# Patient Record
Sex: MALE | Race: WHITE | NOT HISPANIC OR LATINO | Employment: FULL TIME | URBAN - METROPOLITAN AREA
[De-identification: names, ages, dates, MRNs, and addresses within clinical notes are randomized per-mention and may not be internally consistent; named-entity substitution may affect disease eponyms.]

---

## 2019-02-20 ENCOUNTER — HOSPITAL ENCOUNTER (EMERGENCY)
Facility: CLINIC | Age: 54
Discharge: HOME | End: 2019-02-20
Attending: EMERGENCY MEDICINE | Admitting: EMERGENCY MEDICINE
Payer: COMMERCIAL

## 2019-02-20 VITALS
TEMPERATURE: 98.8 F | RESPIRATION RATE: 18 BRPM | SYSTOLIC BLOOD PRESSURE: 155 MMHG | DIASTOLIC BLOOD PRESSURE: 103 MMHG | HEART RATE: 96 BPM | OXYGEN SATURATION: 94 %

## 2019-02-20 DIAGNOSIS — T78.40XA ALLERGIC REACTION, INITIAL ENCOUNTER: ICD-10-CM

## 2019-02-20 PROCEDURE — 96374 THER/PROPH/DIAG INJ IV PUSH: CPT

## 2019-02-20 PROCEDURE — 96361 HYDRATE IV INFUSION ADD-ON: CPT

## 2019-02-20 PROCEDURE — 25000128 H RX IP 250 OP 636: Performed by: EMERGENCY MEDICINE

## 2019-02-20 PROCEDURE — 96372 THER/PROPH/DIAG INJ SC/IM: CPT

## 2019-02-20 PROCEDURE — 25000125 ZZHC RX 250: Performed by: EMERGENCY MEDICINE

## 2019-02-20 PROCEDURE — 99285 EMERGENCY DEPT VISIT HI MDM: CPT | Mod: 25

## 2019-02-20 PROCEDURE — 96375 TX/PRO/DX INJ NEW DRUG ADDON: CPT

## 2019-02-20 RX ORDER — PREDNISONE 20 MG/1
TABLET ORAL
Qty: 10 TABLET | Refills: 0 | Status: SHIPPED | OUTPATIENT
Start: 2019-02-21 | End: 2019-02-27

## 2019-02-20 RX ORDER — METHYLPREDNISOLONE SODIUM SUCCINATE 125 MG/2ML
125 INJECTION, POWDER, LYOPHILIZED, FOR SOLUTION INTRAMUSCULAR; INTRAVENOUS ONCE
Status: COMPLETED | OUTPATIENT
Start: 2019-02-20 | End: 2019-02-20

## 2019-02-20 RX ORDER — EPINEPHRINE 0.3 MG/.3ML
0.3 INJECTION SUBCUTANEOUS
Qty: 0.6 ML | Refills: 0 | Status: SHIPPED | OUTPATIENT
Start: 2019-02-20 | End: 2019-03-22

## 2019-02-20 RX ORDER — EPINEPHRINE 1 MG/ML
0.3 INJECTION, SOLUTION, CONCENTRATE INTRAVENOUS ONCE
Status: COMPLETED | OUTPATIENT
Start: 2019-02-20 | End: 2019-02-20

## 2019-02-20 RX ORDER — DOCUSATE SODIUM 100 MG/1
100 CAPSULE, LIQUID FILLED ORAL
COMMUNITY
Start: 2019-01-31 | End: 2019-03-02

## 2019-02-20 RX ORDER — IBUPROFEN 800 MG/1
800 TABLET, FILM COATED ORAL
COMMUNITY
Start: 2019-02-03 | End: 2021-03-09

## 2019-02-20 RX ADMIN — SODIUM CHLORIDE 1000 ML: 9 INJECTION, SOLUTION INTRAVENOUS at 09:36

## 2019-02-20 RX ADMIN — FAMOTIDINE 20 MG: 10 INJECTION INTRAVENOUS at 09:40

## 2019-02-20 RX ADMIN — EPINEPHRINE 0.3 MG: 1 INJECTION INTRAMUSCULAR; INTRAVENOUS; SUBCUTANEOUS at 09:33

## 2019-02-20 RX ADMIN — METHYLPREDNISOLONE SODIUM SUCCINATE 125 MG: 125 INJECTION, POWDER, FOR SOLUTION INTRAMUSCULAR; INTRAVENOUS at 09:34

## 2019-02-20 ASSESSMENT — ENCOUNTER SYMPTOMS
TROUBLE SWALLOWING: 1
VOMITING: 0
NAUSEA: 0
SHORTNESS OF BREATH: 1

## 2019-02-20 NOTE — ED PROVIDER NOTES
History     Chief Complaint:  Allergic Reaction    HPI   Jeremy Hermosillo is a 53 year old male who presents with allergic reaction. The patient states he started to notice allergic symptoms this morning. The patient feels somewhat short of breath now, but he state his shortness of breath is positional. The patient notes his eye lids were swollen and watering earlier. The patient also notes swallowing feels different. The patient notes the only new thing he had today was Huff's grape juice with a few other fruits in it. The patient denies nausea or vomiting.  The patient notes he is feeling improved after IV Benadryl given by the paramedics as his face feels less swollen.     Allergies:  No Known Drug Allergies    Medications:    Omeprazole  Kenalog  Colace  Ibuprofen    Past Medical History:    Anal abscess  Rotator cuff disorder, left    Past Surgical History:    Hypospadias correction    Family History:    Father: alcohol abuse, stroke  Mother: emphysema, glaucoma  Maternal grandmother: diabetes    Social History:  The patient was accompanied to the ED by wife.  Smoking Status: Never Smoker  Smokeless Tobacco: Never Used  Alcohol Use: Positive  Marital Status:        Review of Systems   HENT: Positive for trouble swallowing.         Swollen face   Eyes:        Eye lid swelling bilaterally   Respiratory: Positive for shortness of breath.    Gastrointestinal: Negative for nausea and vomiting.   All other systems reviewed and are negative.    Physical Exam     Patient Vitals for the past 24 hrs:   BP Temp Pulse Resp SpO2   02/20/19 1515 (!) 155/103 -- 96 -- 94 %   02/20/19 1500 (!) 145/95 -- 90 -- 93 %   02/20/19 1445 (!) 133/98 -- 93 -- 97 %   02/20/19 1430 (!) 135/95 -- 90 -- 94 %   02/20/19 1415 (!) 135/97 -- 91 -- 94 %   02/20/19 1400 139/88 -- 96 -- 97 %   02/20/19 1345 (!) 140/105 -- 88 -- --   02/20/19 1330 (!) 132/94 -- 88 -- 95 %   02/20/19 1315 123/85 -- 91 -- --   02/20/19 1300 (!) 132/96 -- 90 --  93 %   02/20/19 1245 133/90 -- 93 -- 92 %   02/20/19 1230 114/89 -- 88 -- 95 %   02/20/19 1215 106/74 -- 81 -- 97 %   02/20/19 1200 124/81 -- 89 -- 94 %   02/20/19 1145 130/88 -- 97 -- 94 %   02/20/19 1130 140/89 -- 87 -- 97 %   02/20/19 1115 (!) 136/95 -- 111 -- 93 %   02/20/19 1100 134/81 -- 82 -- 93 %   02/20/19 1045 (!) 136/93 -- 91 -- 96 %   02/20/19 1030 122/78 -- 82 -- 96 %   02/20/19 1015 134/85 -- 90 -- 93 %   02/20/19 1000 (!) 174/119 -- 100 -- 92 %   02/20/19 0945 (!) 155/124 -- 108 -- 94 %   02/20/19 0930 (!) 135/105 -- 113 -- 94 %   02/20/19 0915 (!) 159/119 98.8  F (37.1  C) 124 18 95 %     Physical Exam  Constitutional: Vital signs reviewed as above.  HEENT:    Head: No external signs of trauma. No lesions noted.   Eyes: PEERL, EOMI B/L, no pain or limitation of superior gaze. The right eyelid is swollen. There is chemosis noted on the lateral right eye.   Nose: Noncongested, no exudates. No rhinorrhea. No FB noted   Mouth/Throat:     Mucous membranes are moist and normal.     No Oropharyngeal exudate. No oropharyngeal erythema noted.    No tonsillar swelling noted.     No uvular deviation noted.    Mild uvular swelling noted.    No swelling noted on the floor of the mouth  Neck: FROM. Neck is supple. No stridor appreciated  Cardiovascular: Normal rate, regular rhythm and normal heart sounds.  No murmur heard. Equal B/L peripheral pulses.  Pulmonary/Chest: Effort normal and breath sounds normal. No respiratory distress. Patient has no wheezes. Patient has no rales.   Gastrointestinal: Soft. There is no tenderness.   Musculoskeletal/Extremities: No edema noted. Normal tone.  Neurological: Patient is alert and oriented to person, place, and time.   Skin: Skin is warm and dry. There is no diaphoresis noted.   Psychiatric: The patient appears calm.  Emergency Department Course   Interventions:  0933 Epinephrine 0.3 mg Subcutaneous  0934 Solu-medrol 125 mg IV  0936 NS 1000 mL IV  0940 Pepcid 20 mg  IV    Emergency Department Course:    0913 Nursing notes and vitals reviewed.    0917 I performed an exam of the patient as documented above.     1232 I returned to check on the patient.     1452 I returned to check on the patient. I personally answered all related questions prior to discharge.    Impression & Plan      Medical Decision Making:  Jeremy Hermosillo is a 53 year old male who presents to the emergency department today for evaluation of signs/sxs consistent with allergic reaction.  They were given saline, Pepcid, solu-medrol, and epinephrine and observed for 5 hours without worsening of their sxs.  There was no lip/tongue/throat involvement, SOB, CP, lightheadedness/syncope to suggest more-severe allergic reaction and therefore epinephrine was no used. There are no new exposures to suggest a specific allergan.  At this point there are no signs of worsening, the patient is hemodynamically stable and without signs of respiratory involvement -- I believe the patient is safe for discharge home.  They were discharged with the recommendation to use of prednisone and prescribed an epi pen for future use. The patient was instructed to  emergently return to the ED if they develop lip/tongue/throat involvement, SOB, CP, lightheadedness/syncope, or other new and concerning sxs. Recommended follow-up with PMD in 1-2 days for persistent symptoms.  Pt expresses understanding and agreement with the plan.    Diagnosis:    ICD-10-CM    1. Allergic reaction, initial encounter T78.40XA       Disposition:   The patient is discharged to home.    Discharge Medications:     START taking      Dose / Directions   EPINEPHrine 0.3 MG/0.3ML injection 2-pack  Commonly known as:  EPIPEN/ADRENACLICK/or ANY BX GENERIC EQUIV      Dose:  0.3 mg  Inject 0.3 mLs (0.3 mg) into the muscle once as needed for anaphylaxis  Quantity:  0.6 mL  Refills:  0     predniSONE 20 MG tablet  Commonly known as:  DELTASONE      Start taking on:   2/21/2019  Take two tablets (= 40mg) each day for 5 (five) days.  Quantity:  10 tablet  Refills:  0           Where to get your medicines      Some of these will need a paper prescription and others can be bought over the counter. Ask your nurse if you have questions.    Bring a paper prescription for each of these medications    EPINEPHrine 0.3 MG/0.3ML injection 2-pack    predniSONE 20 MG tablet         Scribe Disclosure:  I, Martha Peralta, am serving as a scribe at 9:10 AM on 2/20/2019 to document services personally performed by Brad Conklin DO based on my observations and the provider's statements to me.  [facility] EMERGENCY DEPARTMENT       Brad Conklin DO  02/20/19 0787

## 2019-02-20 NOTE — ED AVS SNAPSHOT
[facility] Emergency Department  [address]  [facility] [address]  Phone:  [phone]  Fax:  [phone]                                    Jeremy Hermosillo   MRN: [id number]    Department:  [facility] Emergency Department   Date of Visit:  2/20/2019           After Visit Summary Signature Page    I have received my discharge instructions, and my questions have been answered. I have discussed any challenges I see with this plan with the nurse or doctor.    ..........................................................................................................................................  Patient/Patient Representative Signature      ..........................................................................................................................................  Patient Representative Print Name and Relationship to Patient    ..................................................               ................................................  Date                                   Time    ..........................................................................................................................................  Reviewed by Signature/Title    ...................................................              ..............................................  Date                                               Time          22EPIC Rev 08/18

## 2019-02-20 NOTE — ED NOTES
Bed: ED23  Expected date: 2/20/19  Expected time: 8:55 AM  Means of arrival: Ambulance  Comments:  A514 allergic RX

## 2021-03-09 ENCOUNTER — HOSPITAL ENCOUNTER (INPATIENT)
Facility: CLINIC | Age: 56
LOS: 7 days | Discharge: HOME | End: 2021-03-17
Attending: EMERGENCY MEDICINE | Admitting: INTERNAL MEDICINE
Payer: COMMERCIAL

## 2021-03-09 DIAGNOSIS — K85.00 IDIOPATHIC ACUTE PANCREATITIS WITHOUT INFECTION OR NECROSIS: ICD-10-CM

## 2021-03-09 DIAGNOSIS — R11.2 NON-INTRACTABLE VOMITING WITH NAUSEA, UNSPECIFIED VOMITING TYPE: ICD-10-CM

## 2021-03-09 DIAGNOSIS — E78.1 HYPERTRIGLYCERIDEMIA: ICD-10-CM

## 2021-03-09 DIAGNOSIS — R10.13 EPIGASTRIC PAIN: ICD-10-CM

## 2021-03-09 LAB
ALBUMIN SERPL-MCNC: 3.4 G/DL (ref 3.4–5)
ALP SERPL-CCNC: 94 U/L (ref 40–150)
ALT SERPL W P-5'-P-CCNC: 28 U/L (ref 0–70)
ANION GAP SERPL CALCULATED.3IONS-SCNC: 8 MMOL/L (ref 3–14)
AST SERPL W P-5'-P-CCNC: 17 U/L (ref 0–45)
BASOPHILS # BLD AUTO: 0 10E9/L (ref 0–0.2)
BASOPHILS NFR BLD AUTO: 0.3 %
BILIRUB SERPL-MCNC: 0.3 MG/DL (ref 0.2–1.3)
BUN SERPL-MCNC: 16 MG/DL (ref 7–30)
CALCIUM SERPL-MCNC: 8.7 MG/DL (ref 8.5–10.1)
CHLORIDE SERPL-SCNC: 105 MMOL/L (ref 94–109)
CO2 SERPL-SCNC: 26 MMOL/L (ref 20–32)
CREAT SERPL-MCNC: 1 MG/DL (ref 0.66–1.25)
DIFFERENTIAL METHOD BLD: ABNORMAL
EOSINOPHIL # BLD AUTO: 0 10E9/L (ref 0–0.7)
EOSINOPHIL NFR BLD AUTO: 0.1 %
ERYTHROCYTE [DISTWIDTH] IN BLOOD BY AUTOMATED COUNT: 12.2 % (ref 10–15)
GFR SERPL CREATININE-BSD FRML MDRD: 83 ML/MIN/{1.73_M2}
GLUCOSE SERPL-MCNC: 162 MG/DL (ref 70–99)
HCT VFR BLD AUTO: 49 % (ref 40–53)
HGB BLD-MCNC: 16.5 G/DL (ref 13.3–17.7)
IMM GRANULOCYTES # BLD: 0 10E9/L (ref 0–0.4)
IMM GRANULOCYTES NFR BLD: 0.2 %
LIPASE SERPL-CCNC: ABNORMAL U/L (ref 73–393)
LYMPHOCYTES # BLD AUTO: 1 10E9/L (ref 0.8–5.3)
LYMPHOCYTES NFR BLD AUTO: 7.5 %
MCH RBC QN AUTO: 29.3 PG (ref 26.5–33)
MCHC RBC AUTO-ENTMCNC: 33.7 G/DL (ref 31.5–36.5)
MCV RBC AUTO: 87 FL (ref 78–100)
MONOCYTES # BLD AUTO: 0.5 10E9/L (ref 0–1.3)
MONOCYTES NFR BLD AUTO: 3.9 %
NEUTROPHILS # BLD AUTO: 11.4 10E9/L (ref 1.6–8.3)
NEUTROPHILS NFR BLD AUTO: 88 %
NRBC # BLD AUTO: 0 10*3/UL
NRBC BLD AUTO-RTO: 0 /100
PLATELET # BLD AUTO: 273 10E9/L (ref 150–450)
POTASSIUM SERPL-SCNC: 3.9 MMOL/L (ref 3.4–5.3)
PROT SERPL-MCNC: 7.1 G/DL (ref 6.8–8.8)
RBC # BLD AUTO: 5.64 10E12/L (ref 4.4–5.9)
SODIUM SERPL-SCNC: 139 MMOL/L (ref 133–144)
WBC # BLD AUTO: 12.9 10E9/L (ref 4–11)

## 2021-03-09 PROCEDURE — 250N000011 HC RX IP 250 OP 636: Performed by: EMERGENCY MEDICINE

## 2021-03-09 PROCEDURE — 83690 ASSAY OF LIPASE: CPT | Performed by: EMERGENCY MEDICINE

## 2021-03-09 PROCEDURE — 258N000003 HC RX IP 258 OP 636: Performed by: EMERGENCY MEDICINE

## 2021-03-09 PROCEDURE — C9803 HOPD COVID-19 SPEC COLLECT: HCPCS

## 2021-03-09 PROCEDURE — 80061 LIPID PANEL: CPT | Performed by: EMERGENCY MEDICINE

## 2021-03-09 PROCEDURE — 99222 1ST HOSP IP/OBS MODERATE 55: CPT | Mod: AI | Performed by: INTERNAL MEDICINE

## 2021-03-09 PROCEDURE — 96376 TX/PRO/DX INJ SAME DRUG ADON: CPT

## 2021-03-09 PROCEDURE — 96361 HYDRATE IV INFUSION ADD-ON: CPT

## 2021-03-09 PROCEDURE — 250N000013 HC RX MED GY IP 250 OP 250 PS 637: Performed by: EMERGENCY MEDICINE

## 2021-03-09 PROCEDURE — 80053 COMPREHEN METABOLIC PANEL: CPT | Performed by: EMERGENCY MEDICINE

## 2021-03-09 PROCEDURE — 85025 COMPLETE CBC W/AUTO DIFF WBC: CPT | Performed by: EMERGENCY MEDICINE

## 2021-03-09 PROCEDURE — 83036 HEMOGLOBIN GLYCOSYLATED A1C: CPT | Performed by: EMERGENCY MEDICINE

## 2021-03-09 PROCEDURE — 96375 TX/PRO/DX INJ NEW DRUG ADDON: CPT

## 2021-03-09 PROCEDURE — 250N000009 HC RX 250: Performed by: EMERGENCY MEDICINE

## 2021-03-09 PROCEDURE — 96374 THER/PROPH/DIAG INJ IV PUSH: CPT

## 2021-03-09 PROCEDURE — 87635 SARS-COV-2 COVID-19 AMP PRB: CPT | Performed by: EMERGENCY MEDICINE

## 2021-03-09 PROCEDURE — 99285 EMERGENCY DEPT VISIT HI MDM: CPT | Mod: 25

## 2021-03-09 RX ORDER — ONDANSETRON 2 MG/ML
4 INJECTION INTRAMUSCULAR; INTRAVENOUS
Status: COMPLETED | OUTPATIENT
Start: 2021-03-09 | End: 2021-03-09

## 2021-03-09 RX ORDER — ONDANSETRON 2 MG/ML
8 INJECTION INTRAMUSCULAR; INTRAVENOUS ONCE
Status: COMPLETED | OUTPATIENT
Start: 2021-03-09 | End: 2021-03-09

## 2021-03-09 RX ORDER — HYDROMORPHONE HYDROCHLORIDE 1 MG/ML
0.5 INJECTION, SOLUTION INTRAMUSCULAR; INTRAVENOUS; SUBCUTANEOUS
Status: DISCONTINUED | OUTPATIENT
Start: 2021-03-09 | End: 2021-03-10

## 2021-03-09 RX ADMIN — ONDANSETRON 8 MG: 2 INJECTION INTRAMUSCULAR; INTRAVENOUS at 21:51

## 2021-03-09 RX ADMIN — LIDOCAINE HYDROCHLORIDE 30 ML: 20 SOLUTION ORAL; TOPICAL at 20:53

## 2021-03-09 RX ADMIN — ONDANSETRON 4 MG: 2 INJECTION INTRAMUSCULAR; INTRAVENOUS at 23:51

## 2021-03-09 RX ADMIN — SODIUM CHLORIDE 1000 ML: 9 INJECTION, SOLUTION INTRAVENOUS at 23:50

## 2021-03-09 RX ADMIN — SODIUM CHLORIDE 1000 ML: 9 INJECTION, SOLUTION INTRAVENOUS at 22:04

## 2021-03-09 RX ADMIN — HYDROMORPHONE HYDROCHLORIDE 0.5 MG: 1 INJECTION, SOLUTION INTRAMUSCULAR; INTRAVENOUS; SUBCUTANEOUS at 23:51

## 2021-03-09 RX ADMIN — HYDROMORPHONE HYDROCHLORIDE 0.5 MG: 1 INJECTION, SOLUTION INTRAMUSCULAR; INTRAVENOUS; SUBCUTANEOUS at 21:51

## 2021-03-09 ASSESSMENT — MIFFLIN-ST. JEOR: SCORE: 1840.97

## 2021-03-10 ENCOUNTER — APPOINTMENT (OUTPATIENT)
Dept: CT IMAGING | Facility: CLINIC | Age: 56
End: 2021-03-10
Attending: INTERNAL MEDICINE
Payer: COMMERCIAL

## 2021-03-10 ENCOUNTER — APPOINTMENT (OUTPATIENT)
Dept: ULTRASOUND IMAGING | Facility: CLINIC | Age: 56
End: 2021-03-10
Attending: EMERGENCY MEDICINE
Payer: COMMERCIAL

## 2021-03-10 PROBLEM — K85.00 IDIOPATHIC ACUTE PANCREATITIS WITHOUT INFECTION OR NECROSIS: Status: ACTIVE | Noted: 2021-03-10

## 2021-03-10 PROBLEM — R11.2 NON-INTRACTABLE VOMITING WITH NAUSEA, UNSPECIFIED VOMITING TYPE: Status: ACTIVE | Noted: 2021-03-10

## 2021-03-10 PROBLEM — R10.13 EPIGASTRIC PAIN: Status: ACTIVE | Noted: 2021-03-10

## 2021-03-10 PROBLEM — E78.1 HYPERTRIGLYCERIDEMIA: Status: ACTIVE | Noted: 2021-03-10

## 2021-03-10 LAB
CHOLEST SERPL-MCNC: 190 MG/DL
HBA1C MFR BLD: 5.7 % (ref 0–5.6)
HDLC SERPL-MCNC: 31 MG/DL
INTERPRETATION ECG - MUSE: NORMAL
LABORATORY COMMENT REPORT: NORMAL
LDLC SERPL CALC-MCNC: 104 MG/DL
NONHDLC SERPL-MCNC: 159 MG/DL
SARS-COV-2 RNA RESP QL NAA+PROBE: NEGATIVE
SPECIMEN SOURCE: NORMAL
TRIGL SERPL-MCNC: 273 MG/DL

## 2021-03-10 PROCEDURE — 250N000011 HC RX IP 250 OP 636: Performed by: INTERNAL MEDICINE

## 2021-03-10 PROCEDURE — 76705 ECHO EXAM OF ABDOMEN: CPT

## 2021-03-10 PROCEDURE — 258N000003 HC RX IP 258 OP 636: Performed by: INTERNAL MEDICINE

## 2021-03-10 PROCEDURE — 250N000011 HC RX IP 250 OP 636

## 2021-03-10 PROCEDURE — 74177 CT ABD & PELVIS W/CONTRAST: CPT

## 2021-03-10 PROCEDURE — 250N000013 HC RX MED GY IP 250 OP 250 PS 637: Performed by: INTERNAL MEDICINE

## 2021-03-10 PROCEDURE — 120N000006 HC R&B PEDS

## 2021-03-10 PROCEDURE — 250N000011 HC RX IP 250 OP 636: Performed by: EMERGENCY MEDICINE

## 2021-03-10 PROCEDURE — 99233 SBSQ HOSP IP/OBS HIGH 50: CPT | Performed by: INTERNAL MEDICINE

## 2021-03-10 PROCEDURE — 96376 TX/PRO/DX INJ SAME DRUG ADON: CPT

## 2021-03-10 RX ORDER — BISACODYL 5 MG
15 TABLET, DELAYED RELEASE (ENTERIC COATED) ORAL DAILY PRN
Status: DISCONTINUED | OUTPATIENT
Start: 2021-03-10 | End: 2021-03-17 | Stop reason: HOSPADM

## 2021-03-10 RX ORDER — IOPAMIDOL 755 MG/ML
500 INJECTION, SOLUTION INTRAVASCULAR ONCE
Status: COMPLETED | OUTPATIENT
Start: 2021-03-10 | End: 2021-03-10

## 2021-03-10 RX ORDER — DEXTROSE MONOHYDRATE, SODIUM CHLORIDE, AND POTASSIUM CHLORIDE 50; 1.49; 4.5 G/1000ML; G/1000ML; G/1000ML
INJECTION, SOLUTION INTRAVENOUS CONTINUOUS
Status: DISCONTINUED | OUTPATIENT
Start: 2021-03-10 | End: 2021-03-17

## 2021-03-10 RX ORDER — HYDROMORPHONE HYDROCHLORIDE 1 MG/ML
0.3 INJECTION, SOLUTION INTRAMUSCULAR; INTRAVENOUS; SUBCUTANEOUS
Status: DISCONTINUED | OUTPATIENT
Start: 2021-03-10 | End: 2021-03-10

## 2021-03-10 RX ORDER — HYDROMORPHONE HYDROCHLORIDE 1 MG/ML
0.3 INJECTION, SOLUTION INTRAMUSCULAR; INTRAVENOUS; SUBCUTANEOUS ONCE
Status: COMPLETED | OUTPATIENT
Start: 2021-03-10 | End: 2021-03-10

## 2021-03-10 RX ORDER — LIDOCAINE 40 MG/G
CREAM TOPICAL
Status: DISCONTINUED | OUTPATIENT
Start: 2021-03-10 | End: 2021-03-17 | Stop reason: HOSPADM

## 2021-03-10 RX ORDER — KETOROLAC TROMETHAMINE 30 MG/ML
30 INJECTION, SOLUTION INTRAMUSCULAR; INTRAVENOUS ONCE
Status: COMPLETED | OUTPATIENT
Start: 2021-03-10 | End: 2021-03-10

## 2021-03-10 RX ORDER — BISACODYL 5 MG
10 TABLET, DELAYED RELEASE (ENTERIC COATED) ORAL DAILY PRN
Status: DISCONTINUED | OUTPATIENT
Start: 2021-03-10 | End: 2021-03-17 | Stop reason: HOSPADM

## 2021-03-10 RX ORDER — NALOXONE HYDROCHLORIDE 0.4 MG/ML
0.2 INJECTION, SOLUTION INTRAMUSCULAR; INTRAVENOUS; SUBCUTANEOUS
Status: DISCONTINUED | OUTPATIENT
Start: 2021-03-10 | End: 2021-03-17 | Stop reason: HOSPADM

## 2021-03-10 RX ORDER — NALOXONE HYDROCHLORIDE 0.4 MG/ML
0.4 INJECTION, SOLUTION INTRAMUSCULAR; INTRAVENOUS; SUBCUTANEOUS
Status: DISCONTINUED | OUTPATIENT
Start: 2021-03-10 | End: 2021-03-17 | Stop reason: HOSPADM

## 2021-03-10 RX ORDER — POLYETHYLENE GLYCOL 3350 17 G/17G
17 POWDER, FOR SOLUTION ORAL DAILY
Status: DISCONTINUED | OUTPATIENT
Start: 2021-03-10 | End: 2021-03-17 | Stop reason: HOSPADM

## 2021-03-10 RX ORDER — ACETAMINOPHEN 325 MG/1
650 TABLET ORAL EVERY 4 HOURS PRN
Status: DISCONTINUED | OUTPATIENT
Start: 2021-03-10 | End: 2021-03-17 | Stop reason: HOSPADM

## 2021-03-10 RX ORDER — HYDROMORPHONE HYDROCHLORIDE 1 MG/ML
0.5 INJECTION, SOLUTION INTRAMUSCULAR; INTRAVENOUS; SUBCUTANEOUS ONCE
Status: DISCONTINUED | OUTPATIENT
Start: 2021-03-10 | End: 2021-03-10

## 2021-03-10 RX ORDER — AMOXICILLIN 250 MG
2 CAPSULE ORAL 2 TIMES DAILY
Status: DISCONTINUED | OUTPATIENT
Start: 2021-03-10 | End: 2021-03-17 | Stop reason: HOSPADM

## 2021-03-10 RX ORDER — NICOTINE POLACRILEX 4 MG/1
40 GUM, CHEWING ORAL DAILY
COMMUNITY

## 2021-03-10 RX ORDER — MULTIPLE VITAMINS W/ MINERALS TAB 9MG-400MCG
1 TAB ORAL DAILY
COMMUNITY

## 2021-03-10 RX ORDER — AMOXICILLIN 250 MG
1 CAPSULE ORAL 2 TIMES DAILY
Status: DISCONTINUED | OUTPATIENT
Start: 2021-03-10 | End: 2021-03-17 | Stop reason: HOSPADM

## 2021-03-10 RX ORDER — ONDANSETRON 4 MG/1
4 TABLET, ORALLY DISINTEGRATING ORAL EVERY 6 HOURS PRN
Status: DISCONTINUED | OUTPATIENT
Start: 2021-03-10 | End: 2021-03-17 | Stop reason: HOSPADM

## 2021-03-10 RX ORDER — MORPHINE SULFATE 2 MG/ML
2-4 INJECTION, SOLUTION INTRAMUSCULAR; INTRAVENOUS
Status: DISCONTINUED | OUTPATIENT
Start: 2021-03-10 | End: 2021-03-10

## 2021-03-10 RX ORDER — HYDROMORPHONE HYDROCHLORIDE 1 MG/ML
INJECTION, SOLUTION INTRAMUSCULAR; INTRAVENOUS; SUBCUTANEOUS
Status: COMPLETED
Start: 2021-03-10 | End: 2021-03-10

## 2021-03-10 RX ORDER — OXYCODONE HYDROCHLORIDE 5 MG/1
5 TABLET ORAL
Status: DISCONTINUED | OUTPATIENT
Start: 2021-03-10 | End: 2021-03-13

## 2021-03-10 RX ORDER — KETOROLAC TROMETHAMINE 15 MG/ML
15 INJECTION, SOLUTION INTRAMUSCULAR; INTRAVENOUS EVERY 6 HOURS PRN
Status: DISPENSED | OUTPATIENT
Start: 2021-03-10 | End: 2021-03-12

## 2021-03-10 RX ORDER — ONDANSETRON 2 MG/ML
4 INJECTION INTRAMUSCULAR; INTRAVENOUS EVERY 6 HOURS PRN
Status: DISCONTINUED | OUTPATIENT
Start: 2021-03-10 | End: 2021-03-17 | Stop reason: HOSPADM

## 2021-03-10 RX ORDER — BISACODYL 5 MG
5 TABLET, DELAYED RELEASE (ENTERIC COATED) ORAL DAILY PRN
Status: DISCONTINUED | OUTPATIENT
Start: 2021-03-10 | End: 2021-03-17 | Stop reason: HOSPADM

## 2021-03-10 RX ORDER — IBUPROFEN 200 MG
600 TABLET ORAL EVERY 6 HOURS PRN
COMMUNITY

## 2021-03-10 RX ADMIN — KETOROLAC TROMETHAMINE 30 MG: 30 INJECTION, SOLUTION INTRAMUSCULAR at 08:59

## 2021-03-10 RX ADMIN — POTASSIUM CHLORIDE, DEXTROSE MONOHYDRATE AND SODIUM CHLORIDE: 150; 5; 450 INJECTION, SOLUTION INTRAVENOUS at 11:53

## 2021-03-10 RX ADMIN — SODIUM CHLORIDE 65 ML: 9 INJECTION, SOLUTION INTRAVENOUS at 03:47

## 2021-03-10 RX ADMIN — KETOROLAC TROMETHAMINE 15 MG: 15 INJECTION, SOLUTION INTRAMUSCULAR; INTRAVENOUS at 21:59

## 2021-03-10 RX ADMIN — POTASSIUM CHLORIDE, DEXTROSE MONOHYDRATE AND SODIUM CHLORIDE: 150; 5; 450 INJECTION, SOLUTION INTRAVENOUS at 03:42

## 2021-03-10 RX ADMIN — KETOROLAC TROMETHAMINE 15 MG: 15 INJECTION, SOLUTION INTRAMUSCULAR; INTRAVENOUS at 14:42

## 2021-03-10 RX ADMIN — OXYCODONE HYDROCHLORIDE 5 MG: 5 TABLET ORAL at 04:06

## 2021-03-10 RX ADMIN — HYDROMORPHONE HYDROCHLORIDE 1 MG: 1 INJECTION, SOLUTION INTRAMUSCULAR; INTRAVENOUS; SUBCUTANEOUS at 00:47

## 2021-03-10 RX ADMIN — HYDROMORPHONE HYDROCHLORIDE 0.5 MG: 1 INJECTION, SOLUTION INTRAMUSCULAR; INTRAVENOUS; SUBCUTANEOUS at 03:19

## 2021-03-10 RX ADMIN — HYDROMORPHONE HYDROCHLORIDE 0.3 MG: 1 INJECTION, SOLUTION INTRAMUSCULAR; INTRAVENOUS; SUBCUTANEOUS at 07:55

## 2021-03-10 RX ADMIN — DOCUSATE SODIUM 50 MG AND SENNOSIDES 8.6 MG 2 TABLET: 8.6; 5 TABLET, FILM COATED ORAL at 20:22

## 2021-03-10 RX ADMIN — HYDROMORPHONE HYDROCHLORIDE 0.3 MG: 1 INJECTION, SOLUTION INTRAMUSCULAR; INTRAVENOUS; SUBCUTANEOUS at 08:13

## 2021-03-10 RX ADMIN — IOPAMIDOL 100 ML: 755 INJECTION, SOLUTION INTRAVENOUS at 03:46

## 2021-03-10 RX ADMIN — POTASSIUM CHLORIDE, DEXTROSE MONOHYDRATE AND SODIUM CHLORIDE: 150; 5; 450 INJECTION, SOLUTION INTRAVENOUS at 18:27

## 2021-03-10 RX ADMIN — Medication: at 09:00

## 2021-03-10 RX ADMIN — PROCHLORPERAZINE EDISYLATE 5 MG: 5 INJECTION INTRAMUSCULAR; INTRAVENOUS at 04:05

## 2021-03-10 SDOH — HEALTH STABILITY: MENTAL HEALTH: HOW MANY STANDARD DRINKS CONTAINING ALCOHOL DO YOU HAVE ON A TYPICAL DAY?: NOT ASKED

## 2021-03-10 SDOH — HEALTH STABILITY: MENTAL HEALTH: HOW OFTEN DO YOU HAVE A DRINK CONTAINING ALCOHOL?: 2-4 TIMES A MONTH

## 2021-03-10 SDOH — HEALTH STABILITY: MENTAL HEALTH: HOW OFTEN DO YOU HAVE 6 OR MORE DRINKS ON ONE OCCASION?: NOT ASKED

## 2021-03-10 ASSESSMENT — ENCOUNTER SYMPTOMS
ABDOMINAL PAIN: 1
FEVER: 0
NAUSEA: 1
DIARRHEA: 0
VOMITING: 0

## 2021-03-10 NOTE — ED TRIAGE NOTES
Here for epigastric abdominal pain started tonight around 5pm and getting worse associated with nausea. ABCs intact.

## 2021-03-10 NOTE — PLAN OF CARE
Vital signs: Stable; B/P: 147/93, Temp: 97.8, HR: 91, RR: 14, Spo2: mid 90's on 3L NC.  Pain Control: Dilaudid PCA and Toradol PRN. Capnography on.  Diet: NPO with ice chips.  Output: Voiding adequately. No stool; last BM 3/9 at 1730.  Activity: Up in room with stand by assist. Resting comfortably between cares.   Updates: Abdomen round and painful. BS hypoactive. Capnography and PCD on. Continue bowel rest.   Plan: Continue to monitor and assess VS/pain/ I&O. Plan for am labs.

## 2021-03-10 NOTE — PLAN OF CARE
While awake rated abdominal pain as 5 -7 aching with sudden sharp pain. Nausea continues despite Zofran given in ED. Medicated with Dilaudid 0.55 mg with little relief stated. Oxycodone 5 mg given along with Compazine and has fallen asleep. Has a loud snore and desats to 88% while sleeping. Has been on 2 LPM O2. Urine is Soumya colored. Tachycardic 90's-106. Will keep on pulse Oximeter. Medicate for pain as ordered. Keep NPO.

## 2021-03-10 NOTE — PROGRESS NOTES
[facility]  Hospitalist Progress Note  Garrison Fajardo MD 03/10/2021    Reason for Stay (Diagnosis): acute idiopathic pancreatitis         Assessment and Plan:      Summary of Stay: Jeremy Hermosillo is a 55 year old male without significant past medical history admitted on 3/9/2021 with refractory nausea with vomiting, exquisite abdominal pain found to have elevated lipase of 10 K and CT findings compatible with acute pancreatitis.  He was admitted for IV hydration and pain control.  Due to severe ongoing pain shortly after admission he was changed to a Dilaudid PCA and also had some relief with Toradol.  No imaging of biliary dilatation on imaging including ultrasound.       Idiopathic pancreatitis  He is not a heavy drinker, last drink 1 week ago/no evidence of choledocho/lithiasis/ Lipid not high enough to cause it.  No recent trauma, on chronic omeprazole which is listed as potential culprit but very rare  -hold PPI for now  -NPO/IVF/IV antiemetics and pain control   -anticipate need for follow up re-imaging given abnormality around the pancreatic head.  -Dilaudid PCA, 0.1 mg doses with 10-minute lockout currently.  --Currently have Toradol available, started Pepcid as well while NPO.     High TG:  Not high enough to cause pancreatitis.  Mildly elevated BG in nonfasting state and during times of stress.  Will ck hgb a1c for completeness sake     Leukocytosis:  Suspect demarginalization from stress reaction      Hyperglycemia:  Non fasting and stress reaction/pancreatitis induced.  Checking a1C.     DVT Prophylaxis: Pneumatic Compression Devices  Code Status: Full Code  Functional Status:  independent  Li:  Not needed  Access:  PIV    Disposition: Anticipate at least 2 more days here prior to getting home.        Interval History (Subjective):      Admitted overnight with acute pancreatitis, idiopathic.  Was having a lot of pain this morning, changed PCA  Given a dose of 30 mg Toradol as  "well with fairly dramatic improvement after that                    Physical Exam:      Last Vital Signs:  BP (!) 147/93   Pulse 91   Temp 97.8  F (36.6  C) (Oral)   Resp 14   Ht 1.753 m (5' 9\")   Wt 102.1 kg (225 lb)   SpO2 94%   BMI 33.23 kg/m      I/O last 3 completed shifts:  In: 20 [P.O.:20]  Out: 325 [Urine:325]    General: Somnolent but alert, awake.  HEENT: NC/AT, eyes anicteric, external occular movements intact, face symmetric.  Cardiac: RRR, S1, S2.  No murmurs appreciated.  Pulmonary: Normal chest rise, normal work of breathing.  Lungs CTA BL  Abdomen: soft, mildly tender diffusely, non-distended.  Bowel Sounds Present.  No guarding.  Extremities: no deformities.  Warm, well perfused.  Skin: no rashes or lesions noted.  Warm and Dry.  Neuro: No focal deficits noted.  Speech clear.  Coordination and strength grossly normal.  Psych: Appropriate affect.         Medications:      All current medications were reviewed with changes reflected in problem list.         Data:      All new lab and imaging data was reviewed.   Labs:  Recent Labs   Lab 03/09/21 2102      POTASSIUM 3.9   CHLORIDE 105   CO2 26   ANIONGAP 8   *   BUN 16   CR 1.00   GFRESTIMATED 83   GFRESTBLACK >90   MARIANA 8.7     Recent Labs   Lab 03/09/21 2102   WBC 12.9*   HGB 16.5   HCT 49.0   MCV 87        Recent Labs   Lab 03/09/21 2102   AST 17   ALT 28   ALKPHOS 94   BILITOTAL 0.3     Recent Labs   Lab 03/09/21 2102   LIPASE 10,540*      Imaging:   Results for orders placed or performed during the hospital encounter of 03/09/21   US Abdomen Limited    Narrative    EXAM: US ABDOMEN LIMITED  LOCATION: [facility]  DATE/TIME: 3/10/2021 12:26 AM    INDICATION: Right upper quadrant abdominal pain  COMPARISON: None.  TECHNIQUE: Limited abdominal ultrasound.    FINDINGS:    GALLBLADDER: Normal. No gallstones, wall thickening, or pericholecystic fluid. Negative sonographic Navarro's sign.    BILE DUCTS: No " biliary dilatation. The common duct measures 3 mm.    LIVER: Diffuse fatty infiltration of the liver. No definitive evidence for focal mass. Portal vein patent on color Doppler imaging.    RIGHT KIDNEY: 12.6 cm in length. No hydronephrosis.    PANCREAS: Not visualized due to bowel gas.    Segmentally visualized proximal aorta and IVC normal in caliber. No ascites.      Impression    IMPRESSION:  1.  No cholelithiasis, pericholecystic fluid or gallbladder wall thickening.    2.  Diffuse fatty infiltration of the liver.    3.  No biliary dilatation.    4.  No hydronephrosis.    5.  Pancreas not visualized due to bowel gas.       CT Abdomen Pelvis w Contrast    Narrative    EXAM: CT ABDOMEN AND PELVIS WITH CONTRAST  LOCATION: [facility]  DATE/TIME: 03/10/2021, 3:43 AM    INDICATION: Pancreatitis, acute, severe. 55-year-old male with central abdominal pain.  COMPARISON: Ultrasound 03/10/2021.  TECHNIQUE: CT scan of the abdomen and pelvis was performed following injection of IV contrast. Multiplanar reformats were obtained. Dose reduction techniques were used.  CONTRAST: 100 mL Isovue-370.    FINDINGS:   LOWER CHEST: Minimal atelectasis in the lung bases. No pleural fluid.    HEPATOBILIARY: Hepatic steatosis. No biliary dilatation. Gallbladder unremarkable.    PANCREAS: Evidence for acute interstitial edematous pancreatitis. No pancreatic ductal dilatation. Slightly more focal area of dense pancreatic tissue near the pancreatic head likely represents an area of focal fatty sparing as there is diffuse fatty   infiltration of the pancreas. This measures 2.5 cm (series 3, image 90 and series 4, image 46). No evidence for pancreatic or peripancreatic necrosis. Minimal acute peripancreatic fluid conforming to the borders of the retroperitoneum.    SPLEEN: Normal.    ADRENAL GLANDS: Normal.    KIDNEYS/BLADDER: Normal.    BOWEL: Colonic diverticulosis without diverticulitis. Appendix unremarkable. No small bowel  dilatation. Minimal inflammatory edema in the duodenal/pancreatic groove with some mild reactive wall thickening involving the second portion of the duodenum and   first portion of the duodenum.    LYMPH NODES: No lymphadenopathy.    VASCULATURE: Normal caliber abdominal aorta. Atherosclerotic vascular calcification.    PELVIC ORGANS: Fat-containing bilateral inguinal hernias, greater on the right.    MUSCULOSKELETAL: No significant osseous abnormality.      Impression    IMPRESSION:   1.  Evidence for acute interstitial edematous pancreatitis without significant ductal dilatation. Slightly more focal area of dense pancreatic tissue near the pancreatic head likely represents an area of focal fatty sparing as there is diffuse fatty   infiltration of the pancreas. However, continued follow-up is recommended to assess for stability of this area.    2.  Minimal acute peripancreatic fluid conforming to the borders of the retroperitoneum. No evidence of pancreatic necrosis or peripancreatic necrosis.    3.  Hepatic steatosis.    4.  Colonic diverticulosis without diverticulitis.    5.  Mild reactive wall thickening of the first and second portions of the duodenum due to the pancreatitis.    6.  Fat-containing bilateral inguinal hernias, greater on the right.             Garrison Fajardo MD , MD.

## 2021-03-10 NOTE — H&P
History and Physical     Jeremy Hermosillo MRN# [id number]   YOB: 1965 Age: 55 year old      Date of Admission:  3/9/2021    Primary care provider: Emre Winslow          Assessment and Plan:     Summary of Stay: Jeremy Hermosillo is a 55 year old male without significant past medical history admitted on 3/9/2021 with refractory nausea with vomiting, exquisite abdominal pain 2/2 pancreatitis.     He had noticed some belly discomfort building throughout the day and then around 530 he had rather abrupt onset of severe pain.  He has had nausea but has been unable to vomit.    He is not a heavy drinker, last drink 1 week ago/no evidence of choledocho/lithiasis/ Lipid not high enough to cause it.  No recent trauma, on chronic omeprazole which is listed as potential culprit but very rare      Problem List:   Idiopathic pancreatitis  He is not a heavy drinker, last drink 1 week ago/no evidence of choledocho/lithiasis/ Lipid not high enough to cause it.  No recent trauma, on chronic omeprazole which is listed as potential culprit but very rare  -hold PPI for now  -NPO/IVF/IV antiemetics and pain control     High TG  Not high enough to cause pancreatitis.  Mildly elevated BG in nonfasting state and during times of stress.  Will ck hgb a1c for completeness sake    Leukocytosis  Suspect demarginalization from stress reaction     Hyperglycemia  Non fasting and stress reaction     DVT Prophylaxis: Pneumatic Compression Devices  Code Status: Full Code  Functional Status:  independent  Li:  Not needed  Access:  PIV              Chief Complaint:     Abdominal pain with nausea but without vomiting       History of Present Illness:   Jeremy Hermosillo is a 55 year old male without significant past medical history admitted on 3/9/2021 with refractory nausea with vomiting, exquisite abdominal pain 2/2 pancreatitis.     He had noticed some belly discomfort building throughout the day and then around 530 he had rather abrupt  "onset of severe pain.  He has had nausea but has been unable to vomit.    He is not a heavy drinker, last drink 1 week ago/no evidence of choledocho/lithiasis/ Lipid not high enough to cause it.  No recent trauma, on chronic omeprazole which is listed as potential culprit but very rare      The history is obtained in discussion with the ER provider Dr Chavez and the patient who is reliable but who really is in quite a bit of pain           Past Medical History:     Past Medical History:   Diagnosis Date     GERD (gastroesophageal reflux disease)              Past Surgical History:     Past Surgical History:   Procedure Laterality Date     HYPOSPADIAS CORRECTION               Social History:     Social History     Tobacco Use     Smoking status: Never Smoker   Substance Use Topics     Alcohol use: Yes     Frequency: 2-4 times a month             Family History:     Family History   Problem Relation Age of Onset     Chronic Obstructive Pulmonary Disease Mother      Alcoholism Father      Cerebrovascular Disease Father      Leukemia Sister         Hairy cell             Allergies:   No Known Allergies          Medications:   MVI and omeprazole per patient interview            Review of Systems:     A Comprehensive greater than 10 system review of systems was carried out.  Pertinent positives and negatives are noted above.  Otherwise negative for contributory information.           Physical Exam:   Blood pressure 125/79, pulse 70, temperature 97.6  F (36.4  C), temperature source Oral, resp. rate 20, height 1.753 m (5' 9\"), weight 102.1 kg (225 lb), SpO2 94 %.  Exam:    General:  Pleasant nad looks stated age wincing in pain, then up on side of bed with emesis bag but never throws up  HEENT:  Head nc/at sclera clear PERRL O/P:  Mask in place  Neck is supple  Lungs: cta b nl effort   CV:  RRR no m/r/g no le edema  Abd:  Waves of significant pain with wincing, very ttp in epigastric region, diffusely moderately " distended  Neuro:  Cn 2-12 grossly intact and valladares  Alert and oriented affect appropriate   Skin:  W/d no c/c               Data:          Lab Results   Component Value Date     03/09/2021    Lab Results   Component Value Date    CHLORIDE 105 03/09/2021    Lab Results   Component Value Date    BUN 16 03/09/2021      Lab Results   Component Value Date    POTASSIUM 3.9 03/09/2021    Lab Results   Component Value Date    CO2 26 03/09/2021    Lab Results   Component Value Date    CR 1.00 03/09/2021        Lab Results   Component Value Date    WBC 12.9 (H) 03/09/2021    HGB 16.5 03/09/2021    HCT 49.0 03/09/2021    MCV 87 03/09/2021     03/09/2021     Lab Results   Component Value Date     (H) 03/09/2021     Lab Results   Component Value Date    AST 17 03/09/2021    ALT 28 03/09/2021    ALKPHOS 94 03/09/2021    BILITOTAL 0.3 03/09/2021   COV 19 negative    Lipase 10, 540    /31/104/273         Imaging:     Recent Results (from the past 24 hour(s))   US Abdomen Limited    Narrative    EXAM: US ABDOMEN LIMITED  LOCATION: [facility]  DATE/TIME: 3/10/2021 12:26 AM    INDICATION: Right upper quadrant abdominal pain  COMPARISON: None.  TECHNIQUE: Limited abdominal ultrasound.    FINDINGS:    GALLBLADDER: Normal. No gallstones, wall thickening, or pericholecystic fluid. Negative sonographic Navarro's sign.    BILE DUCTS: No biliary dilatation. The common duct measures 3 mm.    LIVER: Diffuse fatty infiltration of the liver. No definitive evidence for focal mass. Portal vein patent on color Doppler imaging.    RIGHT KIDNEY: 12.6 cm in length. No hydronephrosis.    PANCREAS: Not visualized due to bowel gas.    Segmentally visualized proximal aorta and IVC normal in caliber. No ascites.      Impression    IMPRESSION:  1.  No cholelithiasis, pericholecystic fluid or gallbladder wall thickening.    2.  Diffuse fatty infiltration of the liver.    3.  No biliary dilatation.    4.  No  hydronephrosis.    5.  Pancreas not visualized due to bowel gas.       CT Abdomen Pelvis w Contrast    Narrative    EXAM: CT ABDOMEN AND PELVIS WITH CONTRAST  LOCATION: [facility]  DATE/TIME: 03/10/2021, 3:43 AM    INDICATION: Pancreatitis, acute, severe. 55-year-old male with central abdominal pain.  COMPARISON: Ultrasound 03/10/2021.  TECHNIQUE: CT scan of the abdomen and pelvis was performed following injection of IV contrast. Multiplanar reformats were obtained. Dose reduction techniques were used.  CONTRAST: 100 mL Isovue-370.    FINDINGS:   LOWER CHEST: Minimal atelectasis in the lung bases. No pleural fluid.    HEPATOBILIARY: Hepatic steatosis. No biliary dilatation. Gallbladder unremarkable.    PANCREAS: Evidence for acute interstitial edematous pancreatitis. No pancreatic ductal dilatation. Slightly more focal area of dense pancreatic tissue near the pancreatic head likely represents an area of focal fatty sparing as there is diffuse fatty   infiltration of the pancreas. This measures 2.5 cm (series 3, image 90 and series 4, image 46). No evidence for pancreatic or peripancreatic necrosis. Minimal acute peripancreatic fluid conforming to the borders of the retroperitoneum.    SPLEEN: Normal.    ADRENAL GLANDS: Normal.    KIDNEYS/BLADDER: Normal.    BOWEL: Colonic diverticulosis without diverticulitis. Appendix unremarkable. No small bowel dilatation. Minimal inflammatory edema in the duodenal/pancreatic groove with some mild reactive wall thickening involving the second portion of the duodenum and   first portion of the duodenum.    LYMPH NODES: No lymphadenopathy.    VASCULATURE: Normal caliber abdominal aorta. Atherosclerotic vascular calcification.    PELVIC ORGANS: Fat-containing bilateral inguinal hernias, greater on the right.    MUSCULOSKELETAL: No significant osseous abnormality.      Impression    IMPRESSION:   1.  Evidence for acute interstitial edematous pancreatitis without  significant ductal dilatation. Slightly more focal area of dense pancreatic tissue near the pancreatic head likely represents an area of focal fatty sparing as there is diffuse fatty   infiltration of the pancreas. However, continued follow-up is recommended to assess for stability of this area.    2.  Minimal acute peripancreatic fluid conforming to the borders of the retroperitoneum. No evidence of pancreatic necrosis or peripancreatic necrosis.    3.  Hepatic steatosis.    4.  Colonic diverticulosis without diverticulitis.    5.  Mild reactive wall thickening of the first and second portions of the duodenum due to the pancreatitis.    6.  Fat-containing bilateral inguinal hernias, greater on the right.

## 2021-03-10 NOTE — ED PROVIDER NOTES
"  History     Chief Complaint:  Abdominal Pain     HPI   Jeremy Hermosillo is a 55 year old male with history of GERD who presents with abdominal pain. The patient states that this evening around 5 pm he began having central abdominal pain with nausea. He says he has never had pain like this before, and the pain does not radiate anywhere. He denies fever, vomiting, diarrhea, recent alcohol use, or a history of abdominal surgery.    Review of Systems   Constitutional: Negative for fever.   Gastrointestinal: Positive for abdominal pain and nausea. Negative for diarrhea and vomiting.   All other systems reviewed and are negative.    Allergies:  The patient has no known allergies.     Medications:  Omeprazole    Past Medical History:    GERD     Past Surgical History:    Hypospadias correction     Family History:    Alcohol abuse  Stroke  Glaucoma    Social History:  Unaccompanied to ED.  Denies recent alcohol use.    Physical Exam     Patient Vitals for the past 24 hrs:   BP Temp Temp src Pulse Resp SpO2 Height Weight   03/10/21 0130 -- -- -- -- -- 91 % -- --   03/10/21 0115 119/74 -- -- 102 -- 87 % -- --   03/10/21 0100 134/113 -- -- 99 -- 91 % -- --   03/10/21 0045 119/84 -- -- 64 -- 99 % -- --   03/10/21 0015 108/83 -- -- 94 -- 93 % -- --   03/10/21 0000 119/74 -- -- 92 -- 95 % -- --   03/09/21 2300 113/72 -- -- 61 16 94 % -- --   03/09/21 2230 99/66 -- -- 58 18 95 % -- --   03/09/21 2204 97/59 -- -- 59 16 95 % -- --   03/09/21 2203 91/51 -- -- -- -- -- -- --   03/09/21 1953 92/51 -- -- -- -- -- -- --   03/09/21 1952 -- 97.1  F (36.2  C) Oral 62 18 98 % 1.753 m (5' 9\") 102.1 kg (225 lb)     Physical Exam  Nursing note and vitals reviewed.  Constitutional: Cooperative. Uncomfortable appearing.  HENT:   Mouth/Throat: Mucous membranes are normal.   Cardiovascular: Normal rate, regular rhythm and normal heart sounds.  No murmur.  Pulmonary/Chest: Effort normal and breath sounds normal. No respiratory distress. No wheezes. " No rales.   Abdominal: Soft. Normal appearance and bowel sounds are normal. No distension. Epigastric tenderness. There is no rigidity and no guarding.   Neurological: Alert. Oriented x4  Skin: Skin is warm and dry.   Psychiatric: Normal mood and affect.     Emergency Department Course   ECG  ECG taken at 2107  Normal sinus rhythm  Nonspecific T wave abnormality   Rate 64 bpm. KY interval 174 ms. QRS duration 104 ms. QT/QTc 404/416 ms. P-R-T axes 51 -5 29.     Imaging:  US Abdomen Limited  1.  No cholelithiasis, pericholecystic fluid or gallbladder wall thickening.  2.  Diffuse fatty infiltration of the liver.  3.  No biliary dilatation.  4.  No hydronephrosis.  5.  Pancreas not visualized due to bowel gas.  Reading per radiology    Laboratory:  CBC: WBC 12.9 (H), HGB 16.5,   CMP: Glucose 162 (H) o/w WNL (Creatinine 1.00)   Lipase: 64419 (H)    Lipid profile: Cholesterol 190, Triglycerides 273 (H), HDL 31 (L),  (H), Non  (H)    Asymptomatic COVID19 Virus PCR: Negative     Reviewed:  I reviewed nursing notes, vitals, past medical history and care everywhere    Assessments:  2340 I obtained history and examined the patient as noted above.     Consults:   0144 I consulted Dr. Gutiérrez of the hospitalist service. They agree to accept care of the patient.    Interventions:  2053 GI Cocktail 30 mL PO  2151 Dilaudid 0.5 mg IV  2151 Zofran 8 mg IV  2204 NS 1000 mL IV  2350 NS 1000 mL IV  2351 Dilaudid 0.5 mg IV  2351 Zofran 4 mg IV    Disposition:  The patient was admitted to the hospital under the care of Dr. Gutiérrez.       Impression & Plan     Medical Decision Making:  Jeremy Hermosillo is a 55 year old male who presents with epigastric abdominal pain.  The differential diagnosis would include GERD, GIB, esophageal spasm, atypical cardiac syptoms, pancreatitis, biliary colic or gallstone disease, AAA, gastroenteritis, gastritis, large vs small bowel disease, etc.  Based on history, exam and labs, the most  likely explanation is pancreatitis due to high triglycerides.  Ultrasound of gallbladder shows fatty infiltration of liver but no obstructing stone.    Abdominal exam is benign at this point besides epigastric tenderness.   Pain control in ED was somewhat successful though still requiring IV doses.  Based on this, will admit for further treatment.      Covid-19  Jeremy Hermosillo was evaluated during a global COVID-19 pandemic, which necessitated consideration that the patient might be at risk for infection with the SARS-CoV-2 virus that causes COVID-19.   Applicable protocols for evaluation were followed during the patient's care.   COVID-19 was considered as part of the patient's evaluation. The plan for testing is:  a test was obtained during this visit.    Diagnosis:    ICD-10-CM   1. Idiopathic acute pancreatitis without infection or necrosis  K85.00   2. Hypertriglyceridemia  E78.1   3. Non-intractable vomiting with nausea, unspecified vomiting type  R11.2   4. Epigastric pain  R10.13     Scribe Disclosure:  Mile POTTER, am serving as a scribe at 12:04 AM on 3/10/2021 to document services personally performed by Mika Chavez MD based on my observations and the provider's statements to me.      Mika Chavez MD  03/10/21 1406

## 2021-03-10 NOTE — ED NOTES
[facility]  ED Nurse Handoff Report    Jeremy Hermosillo is a 55 year old male   ED Chief complaint: Abdominal Pain  . ED Diagnosis:   Final diagnoses:   Idiopathic acute pancreatitis without infection or necrosis   Hypertriglyceridemia   Non-intractable vomiting with nausea, unspecified vomiting type   Epigastric pain     Allergies: No Known Allergies    Code Status:   Activity level - Baseline/Home:  Independent. Activity Level - Current:   Stand by Assist. Lift room needed: No. Bariatric: No   Needed: No   Isolation: No. Infection: Not Applicable.     Vital Signs:   Vitals:    03/09/21 2204 03/09/21 2230 03/09/21 2300 03/10/21 0000   BP: 97/59 99/66 113/72 119/74   Pulse: 59 58 61 92   Resp: 16 18 16    Temp:       TempSrc:       SpO2: 95% 95% 94% 95%   Weight:       Height:           Cardiac Rhythm:  ,      Pain level:    Patient confused: No. Patient Falls Risk: No.   Elimination Status: Has voided   Patient Report - abd pain/nausea.. Focused Assessment: Review of Systems   Constitutional: Negative for fever.   Gastrointestinal: Positive for abdominal pain and nausea. Negative for diarrhea and vomiting.   All other systems reviewed and are negative.  Tests Performed:   Labs Ordered and Resulted from Time of ED Arrival Up to the Time of Departure from the ED   CBC WITH PLATELETS DIFFERENTIAL - Abnormal; Notable for the following components:       Result Value    WBC 12.9 (*)     Absolute Neutrophil 11.4 (*)     All other components within normal limits   COMPREHENSIVE METABOLIC PANEL - Abnormal; Notable for the following components:    Glucose 162 (*)     All other components within normal limits   LIPASE - Abnormal; Notable for the following components:    Lipase 10,540 (*)     All other components within normal limits   LIPID PROFILE - Abnormal; Notable for the following components:    Triglycerides 273 (*)     HDL Cholesterol 31 (*)     LDL Cholesterol Calculated 104 (*)     Non HDL  Cholesterol 159 (*)     All other components within normal limits   SARS-COV-2 (COVID-19) VIRUS RT-PCR     US Abdomen Limited    (Results Pending)   . Abnormal Results:   Abnormal Labs Reviewed   CBC WITH PLATELETS DIFFERENTIAL - Abnormal; Notable for the following components:       Result Value    WBC 12.9 (*)     Absolute Neutrophil 11.4 (*)     All other components within normal limits   COMPREHENSIVE METABOLIC PANEL - Abnormal; Notable for the following components:    Glucose 162 (*)     All other components within normal limits   LIPASE - Abnormal; Notable for the following components:    Lipase 10,540 (*)     All other components within normal limits   LIPID PROFILE - Abnormal; Notable for the following components:    Triglycerides 273 (*)     HDL Cholesterol 31 (*)     LDL Cholesterol Calculated 104 (*)     Non HDL Cholesterol 159 (*)     All other components within normal limits   .   Treatments provided: ed meds  Family Comments:   OBS brochure/video discussed/provided to patient:  N/A  ED Medications:   Medications   HYDROmorphone (PF) (DILAUDID) injection 0.5 mg (0.5 mg Intravenous Given 3/9/21 2151)   HYDROmorphone (PF) (DILAUDID) injection 0.5 mg (0.5 mg Intravenous Given 3/9/21 2351)   lidocaine (XYLOCAINE) 2 % 15 mL, alum & mag hydroxide-simethicone (MAALOX) 15 mL GI Cocktail (30 mLs Oral Given 3/9/21 2053)   ondansetron (ZOFRAN) injection 8 mg (8 mg Intravenous Given 3/9/21 2151)   0.9% sodium chloride BOLUS (1,000 mLs Intravenous New Bag 3/9/21 2204)   0.9% sodium chloride BOLUS (1,000 mLs Intravenous New Bag 3/9/21 2350)   ondansetron (ZOFRAN) injection 4 mg (4 mg Intravenous Given 3/9/21 2351)   HYDROmorphone (DILAUDID) injection 1 mg (1 mg Intravenous Given 3/10/21 0047)     Drips infusing:  No  For the majority of the shift, the patient's behavior Green. Interventions performed were n/a.    Sepsis treatment initiated: No     Patient tested for COVID 19 prior to admission: YES    ED Nurse  Name/Phone Number: Nicki Toney RN,   12:50 AM   RECEIVING UNIT ED HANDOFF REVIEW    Above ED Nurse Handoff Report was reviewed: Yes  Reviewed by: Princess Su RN on March 10, 2021 at 1:08 AM

## 2021-03-10 NOTE — PHARMACY-ADMISSION MEDICATION HISTORY
Admission medication history interview status for this patient is complete. See [facility] admission navigator for allergy information, prior to admission medications and immunization status.     Medication history interview source(s):Patient  Medication history resources (including written lists, pill bottles, clinic record):sure scripts  Primary pharmacy:[facility] on [address] [place]    Changes made to PTA medication list:  Added: omeprazole, MVI, ibuprofen  Deleted: ---  Changed: ---    Actions taken by pharmacist (provider contacted, etc):None     Additional medication history information:None    Medication reconciliation/reorder completed by provider prior to medication history? No, sticky note left for MD          Prior to Admission medications    Medication Sig Last Dose Taking? Auth Provider   ibuprofen (ADVIL/MOTRIN) 200 MG tablet Take 600 mg by mouth every 6 hours as needed for mild pain 3/9/2021 at 1300 Yes Unknown, Entered By History   multivitamin w/minerals (THERA-VIT-M) tablet Take 1 tablet by mouth daily 3/9/2021 at Unknown time Yes Unknown, Entered By History   omeprazole 20 MG tablet Take 40 mg by mouth daily 3/9/2021 at 1300 Yes Unknown, Entered By History

## 2021-03-11 LAB
ANION GAP SERPL CALCULATED.3IONS-SCNC: 2 MMOL/L (ref 3–14)
BUN SERPL-MCNC: 7 MG/DL (ref 7–30)
CALCIUM SERPL-MCNC: 8 MG/DL (ref 8.5–10.1)
CHLORIDE SERPL-SCNC: 106 MMOL/L (ref 94–109)
CO2 SERPL-SCNC: 27 MMOL/L (ref 20–32)
CREAT SERPL-MCNC: 0.81 MG/DL (ref 0.66–1.25)
ERYTHROCYTE [DISTWIDTH] IN BLOOD BY AUTOMATED COUNT: 12.4 % (ref 10–15)
GFR SERPL CREATININE-BSD FRML MDRD: >90 ML/MIN/{1.73_M2}
GLUCOSE SERPL-MCNC: 132 MG/DL (ref 70–99)
HCT VFR BLD AUTO: 45.6 % (ref 40–53)
HGB BLD-MCNC: 14.9 G/DL (ref 13.3–17.7)
LACTATE BLD-SCNC: 0.9 MMOL/L (ref 0.7–2)
LIPASE SERPL-CCNC: 1185 U/L (ref 73–393)
MCH RBC QN AUTO: 29.3 PG (ref 26.5–33)
MCHC RBC AUTO-ENTMCNC: 32.7 G/DL (ref 31.5–36.5)
MCV RBC AUTO: 90 FL (ref 78–100)
PLATELET # BLD AUTO: 221 10E9/L (ref 150–450)
POTASSIUM SERPL-SCNC: 4.1 MMOL/L (ref 3.4–5.3)
RBC # BLD AUTO: 5.09 10E12/L (ref 4.4–5.9)
SODIUM SERPL-SCNC: 135 MMOL/L (ref 133–144)
WBC # BLD AUTO: 16.9 10E9/L (ref 4–11)

## 2021-03-11 PROCEDURE — 83690 ASSAY OF LIPASE: CPT | Performed by: INTERNAL MEDICINE

## 2021-03-11 PROCEDURE — 250N000013 HC RX MED GY IP 250 OP 250 PS 637: Performed by: INTERNAL MEDICINE

## 2021-03-11 PROCEDURE — 250N000011 HC RX IP 250 OP 636: Performed by: INTERNAL MEDICINE

## 2021-03-11 PROCEDURE — 83605 ASSAY OF LACTIC ACID: CPT | Performed by: INTERNAL MEDICINE

## 2021-03-11 PROCEDURE — 99233 SBSQ HOSP IP/OBS HIGH 50: CPT | Performed by: INTERNAL MEDICINE

## 2021-03-11 PROCEDURE — 80048 BASIC METABOLIC PNL TOTAL CA: CPT | Performed by: INTERNAL MEDICINE

## 2021-03-11 PROCEDURE — 36415 COLL VENOUS BLD VENIPUNCTURE: CPT | Performed by: INTERNAL MEDICINE

## 2021-03-11 PROCEDURE — 120N000006 HC R&B PEDS

## 2021-03-11 PROCEDURE — 258N000003 HC RX IP 258 OP 636: Performed by: INTERNAL MEDICINE

## 2021-03-11 PROCEDURE — 85027 COMPLETE CBC AUTOMATED: CPT | Performed by: INTERNAL MEDICINE

## 2021-03-11 RX ADMIN — KETOROLAC TROMETHAMINE 15 MG: 15 INJECTION, SOLUTION INTRAMUSCULAR; INTRAVENOUS at 06:56

## 2021-03-11 RX ADMIN — POTASSIUM CHLORIDE, DEXTROSE MONOHYDRATE AND SODIUM CHLORIDE: 150; 5; 450 INJECTION, SOLUTION INTRAVENOUS at 23:48

## 2021-03-11 RX ADMIN — DOCUSATE SODIUM 50 MG AND SENNOSIDES 8.6 MG 2 TABLET: 8.6; 5 TABLET, FILM COATED ORAL at 20:53

## 2021-03-11 RX ADMIN — KETOROLAC TROMETHAMINE 15 MG: 15 INJECTION, SOLUTION INTRAMUSCULAR; INTRAVENOUS at 13:57

## 2021-03-11 RX ADMIN — POTASSIUM CHLORIDE, DEXTROSE MONOHYDRATE AND SODIUM CHLORIDE: 150; 5; 450 INJECTION, SOLUTION INTRAVENOUS at 01:08

## 2021-03-11 RX ADMIN — POTASSIUM CHLORIDE, DEXTROSE MONOHYDRATE AND SODIUM CHLORIDE: 150; 5; 450 INJECTION, SOLUTION INTRAVENOUS at 07:25

## 2021-03-11 RX ADMIN — POTASSIUM CHLORIDE, DEXTROSE MONOHYDRATE AND SODIUM CHLORIDE: 150; 5; 450 INJECTION, SOLUTION INTRAVENOUS at 13:57

## 2021-03-11 RX ADMIN — DOCUSATE SODIUM 50 MG AND SENNOSIDES 8.6 MG 2 TABLET: 8.6; 5 TABLET, FILM COATED ORAL at 08:50

## 2021-03-11 NOTE — PLAN OF CARE
Vital signs: Stable; B/P: 126/76, Temp: 98.2, HR: 94, RR: 20  Pain Control: PCA and Toradol PRN  Diet: NPO and ice chips.  Output: Voiding josi adequately.  Activity: Up with stand by assist x1. Ambulating in boyle x2.   Updates: Abdomen painful and rounded. No new changes from this morning shift.   Plan: Continue to monitor and assess VS/pain/ I&O.

## 2021-03-11 NOTE — PLAN OF CARE
Capno maintained. Tmax 99.6 oral. Tachycardic to 116. Tachypneic to 22. Maintaining O2 sats 92-94% on 3 LPM O2 via NC. Pain in abdomen rated 5-6/10 moderately controlled with Dilaudid PCA. LS clear and equal. Encouraged deep breathing and I.S. NPO. Voiding tea-colored urine. No BM. Appeared to sleep comfortably between cares.

## 2021-03-11 NOTE — PROGRESS NOTES
[facility]  Hospitalist Progress Note  Chandler Smith MD 03/11/2021    Reason for Stay (Diagnosis): pancreatitis         Assessment and Plan:      Summary of Stay: Jeremy Hermosillo is a 55 year old male without significant past medical history admitted on 3/9/2021 with refractory nausea with vomiting, exquisite abdominal pain found to have elevated lipase of 10 K and CT findings compatible with acute pancreatitis.  He was admitted for IV hydration and pain control.  Due to severe ongoing pain shortly after admission he was changed to a Dilaudid PCA and also had some relief with Toradol.  No biliary dilatation on imaging including ultrasound and LFTs unremarkable.  Denies ETOH use and trig levels OK.  Cause of pancreatitis remains unclear        Idiopathic pancreatitis  He is not a heavy drinker, last drink 1 week ago/no evidence of choledocho/lithiasis/ Trig level OK.  No recent trauma, on chronic omeprazole which is listed as potential culprit but very rare  -hold PPI for now  -NPO/IVF/IV antiemetics and pain control   -GI consult to assist with etiology.  ? EUS as outpt  - continue Dilaudid PCA, 0.1 mg doses with 10-minute lockout currently.       Leukocytosis:  Suspect demarginalization from stress reaction due to above    Spouse present at bedside today and I updated her on POC     DVT Prophylaxis: Pneumatic Compression Devices  Code Status: Full Code  Functional Status:  independent  Li:  Not needed  Access:  PIV     Disposition: Anticipate at least 2-3 more days here prior to getting home.  Still requiring IV pain medication and is NPO.          Interval History (Subjective):      Still having abd pain.  Remains on PCA for sx management.  Remains NPO.  Denies any recent significant ETOH use.  No hx of gallstones.  No hx of pancreatitis.  No hx of hypertriglyceridemia.  On PPI at home; only med he takes beyond a MVI                  Physical Exam:      Last Vital Signs:  /85    "Pulse 118   Temp 101.7  F (38.7  C) (Oral)   Resp 18   Ht 1.753 m (5' 9\")   Wt 102.1 kg (225 lb)   SpO2 92%   BMI 33.23 kg/m        Intake/Output Summary (Last 24 hours) at 3/11/2021 1431  Last data filed at 3/11/2021 1400  Gross per 24 hour   Intake 4934 ml   Output 1700 ml   Net 3234 ml       Constitutional: Awake, alert, cooperative, no apparent distress   Respiratory: Clear to auscultation bilaterally, no crackles or wheezing   Cardiovascular: Regular rate and rhythm, normal S1 and S2, and no murmur noted   Abdomen: hypoactive bowel sounds, soft, non-distended, TTP epigastrum   Skin: No rashes, no cyanosis, dry to touch   Neuro: Alert and oriented x3, no weakness, numbness, memory loss   Extremities: No edema, normal range of motion   Other(s):        All other systems: Negative          Medications:      All current medications were reviewed with changes reflected in problem list.         Data:      All new lab and imaging data was reviewed.   Labs:  Recent Labs   Lab 03/11/21  0653 03/09/21  2102   WBC 16.9* 12.9*   HGB 14.9 16.5   HCT 45.6 49.0   MCV 90 87    273      Imaging:   No results found for this or any previous visit (from the past 24 hour(s)).   "

## 2021-03-11 NOTE — CONSULTS
[facility]  Gastroenterology Consultation         Manuel Love MD    Patient Name: Jeremy Hermosillo MRN# [id number]   YOB: 1965 Age: 55 year old      Date of Admission:  3/9/2021  Today's Date: 03/11/2021         Assessment and Plan:     Impression:  -New onset acute pancreatitis in a 55-year-old gentleman with no significant past medical history.  No evidence for choledocholithiasis or biliary pancreatitis.  No significant history of alcohol consumption consistent with alcoholic pancreatitis.  Triglycerides slightly elevated, certainly not high enough to postulate this is a cause.  No family history of pancreatitis.  No known autoimmune disease history.  No new medications.  No past history of pancreatitis.  Idiopathic pancreatitis, autoimmune pancreatitis, pancreas divisum or microlithiasis need to be entertained improved 50% since admission.  Fever and leukocytosis likely due to retroperitoneal inflammation.  No evidence of necrosis on CT scan.  -Resting oxygen level of 86% on room air.  Patient might have a history of sleep apnea and some secondary lung disease.  Pancreatitis may be playing a role in that the patient has pain when he takes a deep breath which may be affecting his oxygen saturation.  No significant respiratory distress or reports of shortness of breath.    Recommendations:  -Continue n.p.o. except some ice chips.  -Follow lipase, clinical exam, oxygen saturations, CBC & CMP  -We will order IgG subclass for autoimmune pancreatitis.  -Endoscopic ultrasound in 6 to 8 weeks as an outpatient.  We will arrange for this.  -Patient's questions answered today regarding his pancreatitis.  -Case and findings discussed with nursing staff.  -Page out to discuss with admitting hospitalist team.            Primary Care Physician:     Emre Winslow [phone]            Requesting Physician:        Dr. Chandler Vidal         Chief Complaint:     Acute pancreatitis          History of Present Illness:     Jeremy Hermosillo is a 55 year old male who we are asked to evaluate with new diagnosis of pancreatitis.    Patient reports increasing abdominal discomfort on the epigastric region that throughout the day yesterday and then at 5:30 PM he had abrupt onset of significant abdominal pain with nausea but no vomiting.  His pain was a 10/10 when admitted.  However, now his pain id 5/10 with conservative management.  He has no nausea or vomiting.  He has no history of past pancreatitis.  He has no past history of significant chronic GI disease.  He denies dysphagia, odynophagia, nausea is as above but no vomiting, diarrhea, constipation, rectal bleeding, melena, jaundice, fatigue, pruritus, shortness of breath or diaphoresis.  He has no known autoimmune diseases.  He has no mammohistory of pancreatitis.  He does not consume any alcohol.  His fasting lipid panel shows elevated triglycerides but not enough to cause pancreatitis.  He is on no new medications.  He has no known history of pancreas divisum.  Does have oxygen saturations of 86 to 88% on room air.  He does have a history of sleep apnea according to the patient's wife.  The patient reports that it does hurt to take a deep breath.  He has no clinical shortness of breath and is using no accessory muscles of respiration.    CT scan consistent with mild to moderate pancreatitis with no necrosis.  Ultrasound negative except for fatty liver.           Past Medical History:     Past Medical History:   Diagnosis Date     GERD (gastroesophageal reflux disease)              Past Surgical History:     Past Surgical History:   Procedure Laterality Date     HYPOSPADIAS CORRECTION              Home Medications:     Prior to Admission medications    Medication Sig Last Dose Taking? Auth Provider   ibuprofen (ADVIL/MOTRIN) 200 MG tablet Take 600 mg by mouth every 6 hours as needed for mild pain 3/9/2021 at 1300 Yes Unknown, Entered By History    multivitamin w/minerals (THERA-VIT-M) tablet Take 1 tablet by mouth daily 3/9/2021 at Unknown time Yes Unknown, Entered By History   omeprazole 20 MG tablet Take 40 mg by mouth daily 3/9/2021 at 1300 Yes Unknown, Entered By History            Current Medications:           polyethylene glycol  17 g Oral Daily     senna-docusate  1 tablet Oral BID    Or     senna-docusate  2 tablet Oral BID     sodium chloride (PF)  3 mL Intracatheter Q8H     acetaminophen, bisacodyl **OR** bisacodyl **OR** bisacodyl, ketorolac, lidocaine 4%, lidocaine (buffered or not buffered), magnesium hydroxide, melatonin, naloxone **OR** naloxone **OR** naloxone **OR** naloxone, ondansetron **OR** ondansetron, oxyCODONE, prochlorperazine, sodium chloride (PF)         Allergies:     Allergies   Allergen Reactions     Grape (Artificial) Flavor Anaphylaxis            Social History:     Jeremy Hermosillo  reports that he has never smoked. He does not have any smokeless tobacco history on file. He reports current alcohol use.          Family History:     Family History   Problem Relation Age of Onset     Chronic Obstructive Pulmonary Disease Mother      Alcoholism Father      Cerebrovascular Disease Father      Leukemia Sister         Hairy cell              Review of Systems:     Comprehensive GI review of systems is completed and is negative except as above.             Physical Exam:     Vital Signs with Ranges  Temp:  [98.2  F (36.8  C)-101.7  F (38.7  C)] 101.7  F (38.7  C)  Pulse:  [] 118  Resp:  [18-22] 18  BP: (126-149)/(76-85) 135/85  SpO2:  [91 %-97 %] 92 %  I/O's Last 24 hours  I/O last 3 completed shifts:  In: 3733.33 [I.V.:3733.33]  Out: 1150 [Urine:1150]    Constitutional:  Alert and no distress.   HEENT: PERRL, EOMI, sclera nonicteric, conjunctiva pink and moist.  NECK: Supple, nontender. No lymphadenopathy, JVD or bruits noted.  PULMONARY: Clear to auscultation bilaterally.  CARDIOVASCULAR: S1, S2, no S3, no S4, no murmur,  regular rate and rhythm  ABDOMEN: Soft, obese, upper abdominal tenderness, minimal nondistended, no tympany, no organomegaly, no fullness, guarding or rebound are noted.   NEURO: Alert and oriented to place, time and person. Neurological exam grossly nonfocal, CN II through XII are grossly intact. Detailed neurological exam is not performed.  EXT: No cyanosis, clubbing or edema.         Data:   All new lab and imaging data was reviewed.  .  Recent Labs   Lab Test 03/11/21 0653 03/09/21  2102   WBC 16.9* 12.9*   HGB 14.9 16.5   MCV 90 87    273     Recent Labs   Lab Test 03/11/21 0653 03/09/21  2102    139   POTASSIUM 4.1 3.9   CHLORIDE 106 105   CO2 27 26   BUN 7 16   CR 0.81 1.00   ANIONGAP 2* 8     Recent Labs   Lab Test 03/11/21 0653 03/09/21  2102   ALBUMIN  --  3.4   BILITOTAL  --  0.3   ALT  --  28   AST  --  17   ALKPHOS  --  94   LIPASE 1,185* 10,540*     CT 3/10/21    IMPRESSION:   1.  Evidence for acute interstitial edematous pancreatitis without significant ductal dilatation. Slightly more focal area of dense pancreatic tissue near the pancreatic head likely represents an area of focal fatty sparing as there is diffuse fatty   infiltration of the pancreas. However, continued follow-up is recommended to assess for stability of this area.     2.  Minimal acute peripancreatic fluid conforming to the borders of the retroperitoneum. No evidence of pancreatic necrosis or peripancreatic necrosis.     3.  Hepatic steatosis.     4.  Colonic diverticulosis without diverticulitis.     5.  Mild reactive wall thickening of the first and second portions of the duodenum due to the pancreatitis.     6.  Fat-containing bilateral inguinal hernias, greater on the right.      US 3/10/21    IMPRESSION:  1.  No cholelithiasis, pericholecystic fluid or gallbladder wall thickening.     2.  Diffuse fatty infiltration of the liver.     3.  No biliary dilatation.     4.  No hydronephrosis.     5.  Pancreas not  visualized due to bowel gas.     Manuel Love MD, FACG  [facility]  [phone]

## 2021-03-11 NOTE — PLAN OF CARE
Vital Signs: Tmax: 101.7; Provider notified with no change in POC. Maintaining O2 sats on 2L O2.   Pain/Comfort: Pt states that pain is tolerable with current interventions.   Assessment: Abdomen rounded and slightly firm. Denies passing flatus.   Diet: NPO with ice chips. Breathing shallow; using IS with encouragement.   Output: Intact. Urine tea-colored.   Activity/Ambulation: Ambulated hallway times one; tolerated well.   Social: Spouse present at the beside; supportive.   Plan: IVF. Pain control. Monitor and provide for needs.

## 2021-03-12 LAB
ALBUMIN SERPL-MCNC: 2.8 G/DL (ref 3.4–5)
ALP SERPL-CCNC: 93 U/L (ref 40–150)
ALT SERPL W P-5'-P-CCNC: 20 U/L (ref 0–70)
ANION GAP SERPL CALCULATED.3IONS-SCNC: 6 MMOL/L (ref 3–14)
AST SERPL W P-5'-P-CCNC: 12 U/L (ref 0–45)
BASOPHILS # BLD AUTO: 0 10E9/L (ref 0–0.2)
BASOPHILS NFR BLD AUTO: 0.1 %
BILIRUB SERPL-MCNC: 0.9 MG/DL (ref 0.2–1.3)
BUN SERPL-MCNC: 8 MG/DL (ref 7–30)
CALCIUM SERPL-MCNC: 8.6 MG/DL (ref 8.5–10.1)
CHLORIDE SERPL-SCNC: 104 MMOL/L (ref 94–109)
CO2 SERPL-SCNC: 25 MMOL/L (ref 20–32)
CREAT SERPL-MCNC: 0.79 MG/DL (ref 0.66–1.25)
DIFFERENTIAL METHOD BLD: ABNORMAL
EOSINOPHIL # BLD AUTO: 0.1 10E9/L (ref 0–0.7)
EOSINOPHIL NFR BLD AUTO: 0.9 %
ERYTHROCYTE [DISTWIDTH] IN BLOOD BY AUTOMATED COUNT: 12.5 % (ref 10–15)
GFR SERPL CREATININE-BSD FRML MDRD: >90 ML/MIN/{1.73_M2}
GLUCOSE SERPL-MCNC: 136 MG/DL (ref 70–99)
HCT VFR BLD AUTO: 45 % (ref 40–53)
HGB BLD-MCNC: 14.6 G/DL (ref 13.3–17.7)
IMM GRANULOCYTES # BLD: 0.1 10E9/L (ref 0–0.4)
IMM GRANULOCYTES NFR BLD: 0.6 %
LIPASE SERPL-CCNC: 113 U/L (ref 73–393)
LYMPHOCYTES # BLD AUTO: 0.8 10E9/L (ref 0.8–5.3)
LYMPHOCYTES NFR BLD AUTO: 5.2 %
MCH RBC QN AUTO: 29.1 PG (ref 26.5–33)
MCHC RBC AUTO-ENTMCNC: 32.4 G/DL (ref 31.5–36.5)
MCV RBC AUTO: 90 FL (ref 78–100)
MONOCYTES # BLD AUTO: 1 10E9/L (ref 0–1.3)
MONOCYTES NFR BLD AUTO: 6.4 %
NEUTROPHILS # BLD AUTO: 13.1 10E9/L (ref 1.6–8.3)
NEUTROPHILS NFR BLD AUTO: 86.8 %
NRBC # BLD AUTO: 0 10*3/UL
NRBC BLD AUTO-RTO: 0 /100
PLATELET # BLD AUTO: 209 10E9/L (ref 150–450)
POTASSIUM SERPL-SCNC: 3.9 MMOL/L (ref 3.4–5.3)
PROT SERPL-MCNC: 6.8 G/DL (ref 6.8–8.8)
RBC # BLD AUTO: 5.02 10E12/L (ref 4.4–5.9)
SODIUM SERPL-SCNC: 135 MMOL/L (ref 133–144)
WBC # BLD AUTO: 15.1 10E9/L (ref 4–11)

## 2021-03-12 PROCEDURE — 258N000003 HC RX IP 258 OP 636: Performed by: INTERNAL MEDICINE

## 2021-03-12 PROCEDURE — 83690 ASSAY OF LIPASE: CPT | Performed by: INTERNAL MEDICINE

## 2021-03-12 PROCEDURE — 250N000011 HC RX IP 250 OP 636: Performed by: INTERNAL MEDICINE

## 2021-03-12 PROCEDURE — 250N000013 HC RX MED GY IP 250 OP 250 PS 637: Performed by: INTERNAL MEDICINE

## 2021-03-12 PROCEDURE — 85025 COMPLETE CBC W/AUTO DIFF WBC: CPT | Performed by: INTERNAL MEDICINE

## 2021-03-12 PROCEDURE — 120N000006 HC R&B PEDS

## 2021-03-12 PROCEDURE — 80053 COMPREHEN METABOLIC PANEL: CPT | Performed by: INTERNAL MEDICINE

## 2021-03-12 PROCEDURE — 99233 SBSQ HOSP IP/OBS HIGH 50: CPT | Performed by: INTERNAL MEDICINE

## 2021-03-12 PROCEDURE — 36415 COLL VENOUS BLD VENIPUNCTURE: CPT | Performed by: INTERNAL MEDICINE

## 2021-03-12 RX ORDER — FAMOTIDINE 20 MG/1
20 TABLET, FILM COATED ORAL 2 TIMES DAILY
Status: DISCONTINUED | OUTPATIENT
Start: 2021-03-12 | End: 2021-03-17 | Stop reason: HOSPADM

## 2021-03-12 RX ORDER — KETOROLAC TROMETHAMINE 30 MG/ML
30 INJECTION, SOLUTION INTRAMUSCULAR; INTRAVENOUS EVERY 6 HOURS PRN
Status: DISCONTINUED | OUTPATIENT
Start: 2021-03-12 | End: 2021-03-17 | Stop reason: HOSPADM

## 2021-03-12 RX ORDER — NICOTINE POLACRILEX 4 MG/1
40 GUM, CHEWING ORAL DAILY
Status: DISCONTINUED | OUTPATIENT
Start: 2021-03-12 | End: 2021-03-12

## 2021-03-12 RX ADMIN — POTASSIUM CHLORIDE, DEXTROSE MONOHYDRATE AND SODIUM CHLORIDE: 150; 5; 450 INJECTION, SOLUTION INTRAVENOUS at 18:30

## 2021-03-12 RX ADMIN — DOCUSATE SODIUM 50 MG AND SENNOSIDES 8.6 MG 2 TABLET: 8.6; 5 TABLET, FILM COATED ORAL at 08:37

## 2021-03-12 RX ADMIN — KETOROLAC TROMETHAMINE 30 MG: 30 INJECTION, SOLUTION INTRAMUSCULAR at 18:30

## 2021-03-12 RX ADMIN — KETOROLAC TROMETHAMINE 30 MG: 30 INJECTION, SOLUTION INTRAMUSCULAR at 13:06

## 2021-03-12 RX ADMIN — POTASSIUM CHLORIDE, DEXTROSE MONOHYDRATE AND SODIUM CHLORIDE: 150; 5; 450 INJECTION, SOLUTION INTRAVENOUS at 08:37

## 2021-03-12 RX ADMIN — KETOROLAC TROMETHAMINE 15 MG: 15 INJECTION, SOLUTION INTRAMUSCULAR; INTRAVENOUS at 00:08

## 2021-03-12 RX ADMIN — FAMOTIDINE 20 MG: 20 TABLET ORAL at 20:21

## 2021-03-12 RX ADMIN — DOCUSATE SODIUM 50 MG AND SENNOSIDES 8.6 MG 2 TABLET: 8.6; 5 TABLET, FILM COATED ORAL at 20:21

## 2021-03-12 RX ADMIN — KETOROLAC TROMETHAMINE 15 MG: 15 INJECTION, SOLUTION INTRAMUSCULAR; INTRAVENOUS at 06:36

## 2021-03-12 NOTE — PLAN OF CARE
Vital Signs: Afebrile. Maintaining O2 sats on 2L O2 at rest. Brief, self-resolved desats to high 80's with activity.   Pain/Comfort: Abdominal pain relieved with Toradol and PCA.   Assessment: Abdomen rounded and slightly firm. Passing small amounts of flatus. Lungs diminished in bilateral lower lobes; encouraged IS use.   Diet: NPO with ice chips.   Output: Intact. Urine tea-colored.   Activity/Ambulation: Ambulated hallway times one; tolerated well.   Social: Spouse present at the beside; supportive.   Plan: IVF. Pain control. Monitor and provide for needs.

## 2021-03-12 NOTE — PROGRESS NOTES
"GASTROENTEROLOGY PROGRESS NOTE     SUBJECTIVE:  Pain improved but still requiring PCA. No nausea, vomiting. No flatus or BM.      OBJECTIVE:  BP (!) 140/87   Pulse 115   Temp 97.9  F (36.6  C) (Oral)   Resp 15   Ht 1.753 m (5' 9\")   Wt 102.1 kg (225 lb)   SpO2 91%   BMI 33.23 kg/m    Temp (24hrs), Av.1  F (37.3  C), Min:97.9  F (36.6  C), Max:101.7  F (38.7  C)    Patient Vitals for the past 72 hrs:   Weight   21 195 102.1 kg (225 lb)       Intake/Output Summary (Last 24 hours) at 3/12/2021 1110  Last data filed at 3/12/2021 0935  Gross per 24 hour   Intake 1228.67 ml   Output 2175 ml   Net -946.33 ml        PHYSICAL EXAM  Gen: alert, oriented, NAD  Abd: soft, mild distension, hypoactive BS, moderate upper abdominal tenderness           Additional Comments:  ROS, FH, SH: See initial GI consult for details.     I have reviewed the patient's new clinical lab results:     Recent Labs   Lab Test 21  0636 21  0653 21  2102   WBC 15.1* 16.9* 12.9*   HGB 14.6 14.9 16.5   MCV 90 90 87    221 273     Recent Labs   Lab Test 21  0636 21  0653 21  2102   POTASSIUM 3.9 4.1 3.9   CHLORIDE 104 106 105   CO2 25 27 26   BUN 8 7 16   ANIONGAP 6 2* 8     Recent Labs   Lab Test 21  0636 21  0653 21  2102   ALBUMIN 2.8*  --  3.4   BILITOTAL 0.9  --  0.3   ALT 20  --  28   AST 12  --  17   LIPASE 113 1,185* 10,540*     Assessment/Plan:  55 year old male without PMH admitted 3/9  With symptoms of refractory nausea, vomiting, severe abdominal pain. Found to have elevated lipase >10,000 and CT evidence of acute pancreatitis without necrosis or pseudocyst. US without stones, bile duct dilation with normal LFTs.      1. Acute, idiopathic pancreatitis. This is his first episode. No clear etiology. TG elevated but significantly. No history of inciting meds or alcohol use. No gall stones or duct dilation/LFTs normal. Autoimmune causes possible or potentially " microlithiasis. Lipase trending down. Likely reactionary leukocytosis and ntermittent fevers. Not on abx. Clinically with improvement but still requiring IV pain medications.   --Check IgG subclass for autoimmune pancreatitis.   --NPO until PCA able to be stopped.   --IVFs, prn antiemetics.   --No need for antibiotics at this time.     REviewed with Dr. Love.     Mariana Walker, PAC  [facility] ([facility])

## 2021-03-12 NOTE — PLAN OF CARE
"VSS, afebrile.  Abd is rounded and distended, pain in the upper epigastric area controlled with dilauded PCA and ice to back for comfort.  PT is burping frequently, denies flatus, no BM this shift.  Up ambulating in the halls x1 this shift.  PCD's on when in bed.  Incentive spirometer encouraged frequently, though PT reports \"I'm not good at remembering\", so frequent rounds were made this shift to encourage IS use.  Tolerating small amounts of ice chips.  2L nasal cannula.  Stable, will continue to monitor.    "

## 2021-03-12 NOTE — PROGRESS NOTES
"  [facility]  Hospitalist Progress Note  Chandler Smith MD 03/12/2021    Reason for Stay (Diagnosis): pancreatitis         Assessment and Plan:      Summary of Stay: Jeremy Hermosillo is a 55 year old male without significant past medical history admitted on 3/9/2021 with refractory nausea with vomiting, exquisite abdominal pain found to have elevated lipase of 10 K and CT findings compatible with acute pancreatitis.  He was admitted for IV hydration and pain control.  Due to severe ongoing pain shortly after admission he was changed to a Dilaudid PCA and also had some relief with Toradol.  No biliary dilatation on imaging including ultrasound and LFTs unremarkable.  Denies ETOH use and trig levels OK.  Cause of pancreatitis remains unclear        Idiopathic pancreatitis  He is not a heavy drinker.  no evidence of gallstones.  Trig level OK.  No recent trauma, on chronic omeprazole which is listed as potential culprit but very rare  -hold PPI for now  -NPO/IVF/IV antiemetics and pain control   -GI consult to assist with etiology.  ? EUS as outpt.  IgG checked to evaluate for possible autoimmune pancreatitis (results pending)  - continue Dilaudid PCA  - keep NPO until no longer requiring IV medication for pain management        Leukocytosis:  Suspect demarginalization from stress reaction due to above     DVT Prophylaxis: Pneumatic Compression Devices and mobilizing several times a day in the hallway  Code Status: Full Code  Functional Status:  independent  Li:  Not needed  Access:  PIV     Disposition: Anticipate at least 2-3 more days here prior to getting home.  Still requiring IV pain medication and is NPO.        Interval History (Subjective):      Continues to have some abd pain.  No fever.  Ambulating in halls several times a day.  Feels like he is having some \"gas pain\" in lower abdomen.  Passing some flatus.  Continues to use PCA for pain management but feels Toradol IV provides most " "relief for him                  Physical Exam:      Last Vital Signs:  BP (!) 140/87   Pulse 109   Temp 97.9  F (36.6  C) (Oral)   Resp 21   Ht 1.753 m (5' 9\")   Wt 102.1 kg (225 lb)   SpO2 94%   BMI 33.23 kg/m        Intake/Output Summary (Last 24 hours) at 3/12/2021 1259  Last data filed at 3/12/2021 0935  Gross per 24 hour   Intake 1219.67 ml   Output 2175 ml   Net -955.33 ml       Constitutional: Awake, alert, cooperative, no apparent distress   Respiratory: Clear to auscultation bilaterally, no crackles or wheezing   Cardiovascular: Regular rate and rhythm, normal S1 and S2, and no murmur noted   Abdomen: hypoactive bowel sounds, soft, non-distended, TTP epigastrum   Skin: No rashes, no cyanosis, dry to touch   Neuro: Alert and oriented x3, no weakness, numbness, memory loss   Extremities: No edema, normal range of motion   Other(s):        All other systems: Negative          Medications:      All current medications were reviewed with changes reflected in problem list.         Data:      All new lab and imaging data was reviewed.   Labs:  Recent Labs   Lab 03/12/21  0636   WBC 15.1*   HGB 14.6   HCT 45.0   MCV 90         Imaging:   No results found for this or any previous visit (from the past 24 hour(s)).   "

## 2021-03-12 NOTE — PLAN OF CARE
Afebrile. Capno maintained. Intermittently tachycardic to 103. O2 sats 92% on 2 LPM via NC. /77 and 143/70. O/W VSS. Pain rated 5/10 controlled with Dilaudid PCA. Ice pack to back for discomfort. Abdomen distended, soft, tender. BS active and audible all quads. Belching. Denies flatus. NPO, ice chips for comfort. Voiding tea-colored urine. Appeared to sleep comfortably between cares.

## 2021-03-13 LAB
ANION GAP SERPL CALCULATED.3IONS-SCNC: 5 MMOL/L (ref 3–14)
BUN SERPL-MCNC: 12 MG/DL (ref 7–30)
CALCIUM SERPL-MCNC: 8.2 MG/DL (ref 8.5–10.1)
CHLORIDE SERPL-SCNC: 106 MMOL/L (ref 94–109)
CO2 SERPL-SCNC: 27 MMOL/L (ref 20–32)
CREAT SERPL-MCNC: 0.72 MG/DL (ref 0.66–1.25)
GFR SERPL CREATININE-BSD FRML MDRD: >90 ML/MIN/{1.73_M2}
GLUCOSE SERPL-MCNC: 125 MG/DL (ref 70–99)
POTASSIUM SERPL-SCNC: 3.9 MMOL/L (ref 3.4–5.3)
SODIUM SERPL-SCNC: 138 MMOL/L (ref 133–144)

## 2021-03-13 PROCEDURE — 82787 IGG 1 2 3 OR 4 EACH: CPT | Performed by: PHYSICIAN ASSISTANT

## 2021-03-13 PROCEDURE — 36415 COLL VENOUS BLD VENIPUNCTURE: CPT | Performed by: PHYSICIAN ASSISTANT

## 2021-03-13 PROCEDURE — 250N000013 HC RX MED GY IP 250 OP 250 PS 637: Performed by: INTERNAL MEDICINE

## 2021-03-13 PROCEDURE — 82784 ASSAY IGA/IGD/IGG/IGM EACH: CPT | Performed by: PHYSICIAN ASSISTANT

## 2021-03-13 PROCEDURE — 250N000011 HC RX IP 250 OP 636: Performed by: INTERNAL MEDICINE

## 2021-03-13 PROCEDURE — 99233 SBSQ HOSP IP/OBS HIGH 50: CPT | Performed by: INTERNAL MEDICINE

## 2021-03-13 PROCEDURE — 80048 BASIC METABOLIC PNL TOTAL CA: CPT | Performed by: PHYSICIAN ASSISTANT

## 2021-03-13 PROCEDURE — 258N000003 HC RX IP 258 OP 636: Performed by: INTERNAL MEDICINE

## 2021-03-13 PROCEDURE — 120N000006 HC R&B PEDS

## 2021-03-13 RX ORDER — ZOLPIDEM TARTRATE 5 MG/1
5 TABLET ORAL
Status: DISCONTINUED | OUTPATIENT
Start: 2021-03-13 | End: 2021-03-17 | Stop reason: HOSPADM

## 2021-03-13 RX ORDER — OXYCODONE HYDROCHLORIDE 5 MG/1
5-10 TABLET ORAL
Status: DISCONTINUED | OUTPATIENT
Start: 2021-03-13 | End: 2021-03-17 | Stop reason: HOSPADM

## 2021-03-13 RX ORDER — BISACODYL 10 MG
10 SUPPOSITORY, RECTAL RECTAL DAILY PRN
Status: DISCONTINUED | OUTPATIENT
Start: 2021-03-13 | End: 2021-03-17 | Stop reason: HOSPADM

## 2021-03-13 RX ADMIN — OXYCODONE HYDROCHLORIDE 10 MG: 5 TABLET ORAL at 18:37

## 2021-03-13 RX ADMIN — OXYCODONE HYDROCHLORIDE 5 MG: 5 TABLET ORAL at 00:13

## 2021-03-13 RX ADMIN — DOCUSATE SODIUM 50 MG AND SENNOSIDES 8.6 MG 2 TABLET: 8.6; 5 TABLET, FILM COATED ORAL at 08:34

## 2021-03-13 RX ADMIN — KETOROLAC TROMETHAMINE 30 MG: 30 INJECTION, SOLUTION INTRAMUSCULAR at 00:14

## 2021-03-13 RX ADMIN — OXYCODONE HYDROCHLORIDE 10 MG: 5 TABLET ORAL at 21:28

## 2021-03-13 RX ADMIN — KETOROLAC TROMETHAMINE 30 MG: 30 INJECTION, SOLUTION INTRAMUSCULAR at 06:35

## 2021-03-13 RX ADMIN — Medication 1 MG: at 00:14

## 2021-03-13 RX ADMIN — ACETAMINOPHEN 650 MG: 325 TABLET, FILM COATED ORAL at 00:14

## 2021-03-13 RX ADMIN — POTASSIUM CHLORIDE, DEXTROSE MONOHYDRATE AND SODIUM CHLORIDE: 150; 5; 450 INJECTION, SOLUTION INTRAVENOUS at 02:28

## 2021-03-13 RX ADMIN — KETOROLAC TROMETHAMINE 30 MG: 30 INJECTION, SOLUTION INTRAMUSCULAR at 18:37

## 2021-03-13 RX ADMIN — POTASSIUM CHLORIDE, DEXTROSE MONOHYDRATE AND SODIUM CHLORIDE: 150; 5; 450 INJECTION, SOLUTION INTRAVENOUS at 21:31

## 2021-03-13 RX ADMIN — ACETAMINOPHEN 650 MG: 325 TABLET, FILM COATED ORAL at 06:35

## 2021-03-13 RX ADMIN — DOCUSATE SODIUM 50 MG AND SENNOSIDES 8.6 MG 2 TABLET: 8.6; 5 TABLET, FILM COATED ORAL at 20:19

## 2021-03-13 RX ADMIN — FAMOTIDINE 20 MG: 20 TABLET ORAL at 08:34

## 2021-03-13 RX ADMIN — FAMOTIDINE 20 MG: 20 TABLET ORAL at 20:20

## 2021-03-13 RX ADMIN — POLYETHYLENE GLYCOL 3350 17 G: 17 POWDER, FOR SOLUTION ORAL at 12:34

## 2021-03-13 RX ADMIN — OXYCODONE HYDROCHLORIDE 5 MG: 5 TABLET ORAL at 12:35

## 2021-03-13 RX ADMIN — POTASSIUM CHLORIDE, DEXTROSE MONOHYDRATE AND SODIUM CHLORIDE: 150; 5; 450 INJECTION, SOLUTION INTRAVENOUS at 12:51

## 2021-03-13 RX ADMIN — OXYCODONE HYDROCHLORIDE 5 MG: 5 TABLET ORAL at 15:41

## 2021-03-13 RX ADMIN — KETOROLAC TROMETHAMINE 30 MG: 30 INJECTION, SOLUTION INTRAMUSCULAR at 12:35

## 2021-03-13 NOTE — PROGRESS NOTES
"  GASTROENTEROLOGY PROGRESS NOTE     IMPRESSION:  1.  Acute pancreatitis-appears to be idiopathic, no evidence of choledocholithiasis.  No alcohol, triglycerides only mildly elevated, no family history pancreatitis.  Some improvement from yesterday, he is off IV pain meds.  We will start clear liquids.  If ongoing improvement, could advance diet tomorrow.  He is passing flatus, though no stool.    RECOMMENDATIONS:  1.  Clear diet  2.  If ongoing improvement, advance as tolerated in the a.m.  3.  Await autoimmune pancreatitis serology  4.  Endoscopic ultrasound in 6 to 8 weeks.    Chandler Rousseau MD  [facility]  [phone]      ________________________________________________________________________      SUBJECTIVE:  Patient reports some ongoing abdominal pain, but it is improved.  He does have positive bowel sounds are gurgling and is passing flatus.  No stool yet.       OBJECTIVE:  BP (!) 139/91 (BP Location: Right arm)   Pulse 90   Temp 98.6  F (37  C) (Oral)   Resp 19   Ht 1.753 m (5' 9\")   Wt 102.1 kg (225 lb)   SpO2 94%   BMI 33.23 kg/m    Temp (24hrs), Av  F (37.2  C), Min:98.3  F (36.8  C), Max:100.5  F (38.1  C)    No data found.     PHYSICAL EXAM  GEN: Alert, NAD.    HRT: reg  LUNGS: CTA  ABD: +BS, moderate epigastric tender, mildly distended, no rebound or guarding.    Additional Data:  I have reviewed the patient's new clinical lab results:     Recent Labs   Lab Test 21  0636 21  0653 21  2102   WBC 15.1* 16.9* 12.9*   HGB 14.6 14.9 16.5   MCV 90 90 87    221 273     Recent Labs   Lab Test 21  0636 21  0636 21  0653    135 135   POTASSIUM 3.9 3.9 4.1   CHLORIDE 106 104 106   CO2 27 25 27   BUN 12 8 7   CR 0.72 0.79 0.81   ANIONGAP 5 6 2*   MARIANA 8.2* 8.6 8.0*   * 136* 132*     Recent Labs   Lab Test 21  0636 21  0653 21  2102   ALBUMIN 2.8*  --  3.4   BILITOTAL 0.9  --  0.3   ALT 20  --  28   AST 12  --  17 "   LIPASE 113 1,185* 05,540*

## 2021-03-13 NOTE — PLAN OF CARE
VSS, afebrile.  Abd is rounded and distended, pain in the upper epigastric area controlled with dilauded PCA  and toradol, and aqua k for back discomfort.  PT is burping frequently, passing flatus, no BM this shift.  Up ambulating in the halls x2.  PCD's on when in bed.  Incentive spirometer encouraged frequently.  Tolerating small amounts of ice chips. On RA.  Stable, will continue to monitor.

## 2021-03-13 NOTE — PLAN OF CARE
"Presentation/Diagnosis: Pancreatitis  History: GERD  Labs/Protocols: Last lipase 113 - Recheck ordered for AM. Last WBC 15.1.   Vitals: /75 (BP Location: Left arm)   Pulse 88   Temp 98.3  F (36.8  C) (Oral)   Resp 18   Ht 1.753 m (5' 9\")   Wt 102.1 kg (225 lb)   SpO2 95%   BMI 33.23 kg/m    Cardiac: WDL  Telemetry: N/A  Respiratory: LS clear. IS at bedside.   Neuro: A/Ox4. Denies numbness/tingling.   GI/: Abdomen distended and tender. BS+, reports passing gas. Last BM 3/9 - PO senna scheduled. PO miralax given. Supp available if needed.   Skin: Intact.   LDAs: PIV infusing.   Diet: NPO except meds and ice chips all morning. GI advanced to clears this afternoon. Potentially advance in AM.   Activity: SBA d/t lines.   Pain: PCA ineffective and discontinued. PRN toradol q 6 hours is currently most effective per pt. Oxycodone increased from 5mg to 5-10mg q 3 hours.  Plan: Plan for discharge home with spouse when medically stable. Need to advance diet prior to discharge - tomorrow??. Per GI note, will need endoscopic US in 6-8 weeks.       "

## 2021-03-13 NOTE — PROGRESS NOTES
[facility]  Hospitalist Progress Note  Chandler Smith MD 03/13/2021    Reason for Stay (Diagnosis): pancreatitis         Assessment and Plan:      Summary of Stay: Jeremy Hermosillo is a 55 year old male without significant past medical history admitted on 3/9/2021 with refractory nausea with vomiting, exquisite abdominal pain found to have elevated lipase of 10 K and CT findings compatible with acute pancreatitis.  He was admitted for IV hydration and pain control.  Due to severe ongoing pain shortly after admission he was changed to a Dilaudid PCA and also had some relief with Toradol.  No biliary dilatation on imaging including ultrasound and LFTs unremarkable.  Denies ETOH use and trig levels OK.  Cause of pancreatitis remains unclear    Had worsening back pain overnight; suspect related to pancreatitis.  Has the most benefit from Toradol; does not think PCA is helping.  Minimal BM    Plans today:  - stop PCA - pt feels no benefit from this  - increase oxycodone to 10 mg every 3 hours prn  - continue Toradol prn  - mobilize in halls  - stool softeners ordered        Idiopathic pancreatitis  He is not a heavy drinker.  no evidence of gallstones.  Trig level OK.  No recent trauma, on chronic omeprazole which is listed as potential culprit but very rare  -hold PPI for now  -NPO/IVF/IV antiemetics and pain control   -GI consult to assist with etiology.  ? EUS as outpt.  IgG checked to evaluate for possible autoimmune pancreatitis (results pending)  - keep NPO until no longer requiring IV medication for pain management        Leukocytosis:  Suspect demarginalization from stress reaction due to above    Acute mid-back pain:  Suspect due to pancreatitis.  Worse when lying flat on his back.  Pain medications prn     DVT Prophylaxis: Pneumatic Compression Devices and mobilizing several times a day in the hallway  Code Status: Full Code  Functional Status:  independent  Li:  Not  "needed  Access:  PIV     Disposition: Anticipate at least 2-3 more days here prior to getting home.  Still requiring IV pain medication and is NPO.        Interval History (Subjective):      Developed acute mid-back pain yesterday evening.  Completely relieved when given Toradol but returns after several hours.  Worse lying flat.  Slept poorly due to pain.  Does not think PCA is helping him.  No BM but is passing gas.  Up walking in halls yesterday                  Physical Exam:      Last Vital Signs:  /75 (BP Location: Left arm)   Pulse 88   Temp 98.3  F (36.8  C) (Oral)   Resp 18   Ht 1.753 m (5' 9\")   Wt 102.1 kg (225 lb)   SpO2 95%   BMI 33.23 kg/m        Intake/Output Summary (Last 24 hours) at 3/13/2021 1239  Last data filed at 3/13/2021 0844  Gross per 24 hour   Intake 348 ml   Output 1000 ml   Net -652 ml       Constitutional: Awake, alert, cooperative, no apparent distress.  Spouse present   Respiratory: Clear to auscultation bilaterally, no crackles or wheezing   Cardiovascular: Regular rate and rhythm, normal S1 and S2, and no murmur noted   Abdomen: hypoactive bowel sounds, soft, non-distended, TTP epigastrum   Skin: No rashes, no cyanosis, dry to touch   Neuro: Alert and oriented x3, no weakness, numbness, memory loss   Extremities: No edema, normal range of motion   Other(s):        All other systems: Negative          Medications:      All current medications were reviewed with changes reflected in problem list.         Data:      All new lab and imaging data was reviewed.   Labs:  Recent Labs   Lab 03/12/21  0636   WBC 15.1*   HGB 14.6   HCT 45.0   MCV 90         Imaging:   No results found for this or any previous visit (from the past 24 hour(s)).   "

## 2021-03-13 NOTE — PLAN OF CARE
"Rated mid epigastric pain as 5. Frustrated with mid to low back pain. Said dilaudid \"PCA is no help.\"Tried sleeping in recliner with cold pad with no relief. Medicated with Tylenol, oxycodone, and Toradol and was able to rest. Burping and passing large amounts of gas. Denies nausea. After falling asleep desaturated to 85%. Urine is josi colored. New IV placed due to patient pulling it out while sleeping. Remains NPO. IV infusing at 100 ml/hr.GI consulting.   "

## 2021-03-14 ENCOUNTER — APPOINTMENT (OUTPATIENT)
Dept: GENERAL RADIOLOGY | Facility: CLINIC | Age: 56
End: 2021-03-14
Attending: INTERNAL MEDICINE
Payer: COMMERCIAL

## 2021-03-14 LAB
ANION GAP SERPL CALCULATED.3IONS-SCNC: 5 MMOL/L (ref 3–14)
BUN SERPL-MCNC: 11 MG/DL (ref 7–30)
CALCIUM SERPL-MCNC: 8.3 MG/DL (ref 8.5–10.1)
CHLORIDE SERPL-SCNC: 106 MMOL/L (ref 94–109)
CO2 SERPL-SCNC: 26 MMOL/L (ref 20–32)
CREAT SERPL-MCNC: 0.81 MG/DL (ref 0.66–1.25)
GFR SERPL CREATININE-BSD FRML MDRD: >90 ML/MIN/{1.73_M2}
GLUCOSE SERPL-MCNC: 118 MG/DL (ref 70–99)
LIPASE SERPL-CCNC: 78 U/L (ref 73–393)
POTASSIUM SERPL-SCNC: 3.8 MMOL/L (ref 3.4–5.3)
SODIUM SERPL-SCNC: 137 MMOL/L (ref 133–144)

## 2021-03-14 PROCEDURE — 258N000003 HC RX IP 258 OP 636: Performed by: INTERNAL MEDICINE

## 2021-03-14 PROCEDURE — 250N000013 HC RX MED GY IP 250 OP 250 PS 637: Performed by: INTERNAL MEDICINE

## 2021-03-14 PROCEDURE — 36415 COLL VENOUS BLD VENIPUNCTURE: CPT | Performed by: INTERNAL MEDICINE

## 2021-03-14 PROCEDURE — 250N000011 HC RX IP 250 OP 636: Performed by: INTERNAL MEDICINE

## 2021-03-14 PROCEDURE — 80048 BASIC METABOLIC PNL TOTAL CA: CPT | Performed by: INTERNAL MEDICINE

## 2021-03-14 PROCEDURE — 120N000006 HC R&B PEDS

## 2021-03-14 PROCEDURE — 74019 RADEX ABDOMEN 2 VIEWS: CPT

## 2021-03-14 PROCEDURE — 99232 SBSQ HOSP IP/OBS MODERATE 35: CPT | Performed by: INTERNAL MEDICINE

## 2021-03-14 PROCEDURE — 83690 ASSAY OF LIPASE: CPT | Performed by: INTERNAL MEDICINE

## 2021-03-14 RX ADMIN — KETOROLAC TROMETHAMINE 30 MG: 30 INJECTION, SOLUTION INTRAMUSCULAR at 20:23

## 2021-03-14 RX ADMIN — POTASSIUM CHLORIDE, DEXTROSE MONOHYDRATE AND SODIUM CHLORIDE: 150; 5; 450 INJECTION, SOLUTION INTRAVENOUS at 08:02

## 2021-03-14 RX ADMIN — OXYCODONE HYDROCHLORIDE 10 MG: 5 TABLET ORAL at 00:28

## 2021-03-14 RX ADMIN — KETOROLAC TROMETHAMINE 30 MG: 30 INJECTION, SOLUTION INTRAMUSCULAR at 07:59

## 2021-03-14 RX ADMIN — OXYCODONE HYDROCHLORIDE 10 MG: 5 TABLET ORAL at 07:59

## 2021-03-14 RX ADMIN — FAMOTIDINE 20 MG: 20 TABLET ORAL at 20:22

## 2021-03-14 RX ADMIN — FAMOTIDINE 20 MG: 20 TABLET ORAL at 07:59

## 2021-03-14 RX ADMIN — POTASSIUM CHLORIDE, DEXTROSE MONOHYDRATE AND SODIUM CHLORIDE: 150; 5; 450 INJECTION, SOLUTION INTRAVENOUS at 18:10

## 2021-03-14 RX ADMIN — DOCUSATE SODIUM 50 MG AND SENNOSIDES 8.6 MG 2 TABLET: 8.6; 5 TABLET, FILM COATED ORAL at 20:22

## 2021-03-14 RX ADMIN — OXYCODONE HYDROCHLORIDE 10 MG: 5 TABLET ORAL at 14:27

## 2021-03-14 RX ADMIN — MAGNESIUM HYDROXIDE 30 ML: 400 SUSPENSION ORAL at 11:32

## 2021-03-14 RX ADMIN — OXYCODONE HYDROCHLORIDE 10 MG: 5 TABLET ORAL at 11:31

## 2021-03-14 RX ADMIN — KETOROLAC TROMETHAMINE 30 MG: 30 INJECTION, SOLUTION INTRAMUSCULAR at 00:28

## 2021-03-14 RX ADMIN — POLYETHYLENE GLYCOL 3350 17 G: 17 POWDER, FOR SOLUTION ORAL at 07:58

## 2021-03-14 RX ADMIN — DOCUSATE SODIUM 50 MG AND SENNOSIDES 8.6 MG 2 TABLET: 8.6; 5 TABLET, FILM COATED ORAL at 07:59

## 2021-03-14 RX ADMIN — OXYCODONE HYDROCHLORIDE 10 MG: 5 TABLET ORAL at 18:15

## 2021-03-14 RX ADMIN — OXYCODONE HYDROCHLORIDE 10 MG: 5 TABLET ORAL at 04:33

## 2021-03-14 RX ADMIN — KETOROLAC TROMETHAMINE 30 MG: 30 INJECTION, SOLUTION INTRAMUSCULAR at 14:25

## 2021-03-14 NOTE — PROGRESS NOTES
[facility]  Hospitalist Progress Note  Chandler Smith MD 03/14/2021    Reason for Stay (Diagnosis): pancreatitis         Assessment and Plan:      Summary of Stay: Jeremy Hermosillo is a 55 year old male without significant past medical history admitted on 3/9/2021 with refractory nausea with vomiting, exquisite abdominal pain found to have elevated lipase of 10 K and CT findings compatible with acute pancreatitis.  He was admitted for IV hydration and pain control.  Due to severe ongoing pain shortly after admission he was changed to a Dilaudid PCA and also had some relief with Toradol.  No biliary dilatation on imaging including ultrasound and LFTs unremarkable.  Denies ETOH use and trig levels OK.  Cause of pancreatitis remains unclear     He is receiving Toradol and oxycodone prn for pain.  He is on a clear liquid diet.  No BM and suspect he has a mild/mod ileus related to pancreatitis     Plans today:  - continue clear liquid diet - would not advance given continued pain and suspected ileus  - continuing mobilizing in halls  - will need to start to consider supplemental nutrition in the next several days if unable to advance diet further  - receiving prn Toradol for pain.  Follow BMP daily      Idiopathic pancreatitis  He is not a heavy drinker.  no evidence of gallstones.  Trig level OK.  No recent trauma, on chronic omeprazole which is listed as potential culprit but very rare  -hold PPI for now  -GI consulted to assist with etiology.  ? EUS as outpt.  IgG checked to evaluate for possible autoimmune pancreatitis (results pending)  - continue clear liquids       Ileus:  Secondary to above.  Continue clear liquid diet.  continue stool softeners and mobilizing in halls.  Will improve as pancreatis resolves      Acute mid-back pain:  Suspect due to pancreatitis.  Worse when lying flat on his back.  Pain medications prn     DVT Prophylaxis: Pneumatic Compression Devices and mobilizing several  "times a day in the hallway  Code Status: Full Code  Functional Status:  independent  Li:  Not needed  Access:  PIV     Disposition: Anticipate at least 3-5 more days here prior to getting home.  Still requiring IV pain medication           Interval History (Subjective):      Continues to have some epigastric pain but sx relieved with pain meds.  Slept much better last night.  Ambulating in halls.  On clears and tolerating diet.  Took a shower today.  No BM despite stool softeners                  Physical Exam:      Last Vital Signs:  /78 (BP Location: Right arm)   Pulse 76   Temp 98.5  F (36.9  C)   Resp 18   Ht 1.753 m (5' 9\")   Wt 102.1 kg (225 lb)   SpO2 92%   BMI 33.23 kg/m        Intake/Output Summary (Last 24 hours) at 3/14/2021 1329  Last data filed at 3/14/2021 1015  Gross per 24 hour   Intake 2535.03 ml   Output 1075 ml   Net 1460.03 ml       Constitutional: Awake, alert, cooperative, no apparent distress.  Sitting in bedside chair watching TV   Respiratory: Clear to auscultation bilaterally, no crackles or wheezing   Cardiovascular: Regular rate and rhythm, normal S1 and S2, and no murmur noted   Abdomen: Normal bowel sounds, soft, non-distended, non-tender   Skin: No rashes, no cyanosis, dry to touch   Neuro: Alert and oriented x3, no weakness, numbness, memory loss   Extremities: No edema, normal range of motion   Other(s):        All other systems: Negative          Medications:      All current medications were reviewed with changes reflected in problem list.         Data:      All new lab and imaging data was reviewed.   Labs:  Recent Labs   Lab 03/12/21  0636   WBC 15.1*   HGB 14.6   HCT 45.0   MCV 90         Imaging:   Recent Results (from the past 24 hour(s))   XR Abdomen 2 Views    Narrative    ABDOMEN TWO VIEWS   3/14/2021 9:33 AM     HISTORY: Pancreatitis, evaluate for ileus.    COMPARISON: CT abdomen/pelvis on 3/10/2021      Impression    IMPRESSION: Upright and supine " views of the abdomen and pelvis were  obtained. Gas-filled colonic loops, otherwise nonobstructive bowel gas  pattern. No free peritoneal or portal venous gas. Mild bibasilar  pulmonary opacities, likely atelectasis.    RAJNIDER FAIRBANKS MD

## 2021-03-14 NOTE — PROGRESS NOTES
"  GASTROENTEROLOGY PROGRESS NOTE     IMPRESSION:  1.  Acute pancreatitis-appears to be idiopathic, no evidence of choledocholithiasis.  No alcohol, triglycerides only mildly elevated, no family history pancreatitis.  Some discomfort with clears. Some increased abdominal distention. Still passing flatus, no stool.    RECOMMENDATIONS:  1. Check AXR  2. IF AXR negative for ileus, ok to keep trying clears  3. If ongoing pain, inability to tolerate clear liquids tomorrow, would consider repeat CT to evaluate for necrosis and consider either enteral (via post-pyloric TF) or potentially TPN.  4. Monitor labs.     Chandler Rousseau MD  [facility]  [phone]      ________________________________________________________________________      SUBJECTIVE:  Patient reports some pain with clear liquids, some abd distention. Still passing flatus.        OBJECTIVE:  /78 (BP Location: Right arm)   Pulse 76   Temp 98.5  F (36.9  C)   Resp 18   Ht 1.753 m (5' 9\")   Wt 102.1 kg (225 lb)   SpO2 92%   BMI 33.23 kg/m    Temp (24hrs), Av.5  F (36.9  C), Min:98.3  F (36.8  C), Max:98.7  F (37.1  C)    No data found.     PHYSICAL EXAM  GEN: Alert, NAD.    HRT: reg  LUNGS: CTA  ABD: hypoBS, non-tender, distended, no rebound or guarding.    Additional Data:  I have reviewed the patient's new clinical lab results:     Recent Labs   Lab Test 21  0636 21  0653 21  2102   WBC 15.1* 16.9* 12.9*   HGB 14.6 14.9 16.5   MCV 90 90 87    221 273     Recent Labs   Lab Test 21  0700 21  0636 21  0636    138 135   POTASSIUM 3.8 3.9 3.9   CHLORIDE 106 106 104   CO2 26 27 25   BUN 11 12 8   CR 0.81 0.72 0.79   ANIONGAP 5 5 6   MARIANA 8.3* 8.2* 8.6   * 125* 136*     Recent Labs   Lab Test 21  0700 21  0636 21  0653 21  2102   ALBUMIN  --  2.8*  --  3.4   BILITOTAL  --  0.9  --  0.3   ALT  --  20  --  28   AST  --  12  --  17   LIPASE 78 113 1,185* 10,540* "

## 2021-03-14 NOTE — PLAN OF CARE
Vital signs: Stable  Pain/comfort: At patient's comfort goal with 10mg Oxycodone q3hr and Toradol q6hr  Assessment: Abdomen firm and tender; unchanged  Nutrition: Tolerated sips of water overnight  Output: Urine still tea colored  Activity/ambulation: Independently ambulated to the bathroom  Plan: Pain control; offer additional stool softeners; still no bowel movement

## 2021-03-14 NOTE — PLAN OF CARE
"Pain managed with Oxycodone. Ambulated in halls. Attempted clears, but reported \"it didn't go well.\" Ambulated in halls. Senna given. Voiding josi urine. No BM. BS active. Passing flatus.   "

## 2021-03-15 LAB
ALBUMIN SERPL-MCNC: 2.5 G/DL (ref 3.4–5)
ALP SERPL-CCNC: 92 U/L (ref 40–150)
ALT SERPL W P-5'-P-CCNC: 36 U/L (ref 0–70)
ANION GAP SERPL CALCULATED.3IONS-SCNC: 5 MMOL/L (ref 3–14)
AST SERPL W P-5'-P-CCNC: 27 U/L (ref 0–45)
BASOPHILS # BLD AUTO: 0 10E9/L (ref 0–0.2)
BASOPHILS NFR BLD AUTO: 0.4 %
BILIRUB SERPL-MCNC: 0.9 MG/DL (ref 0.2–1.3)
BUN SERPL-MCNC: 10 MG/DL (ref 7–30)
CALCIUM SERPL-MCNC: 8.2 MG/DL (ref 8.5–10.1)
CHLORIDE SERPL-SCNC: 107 MMOL/L (ref 94–109)
CO2 SERPL-SCNC: 27 MMOL/L (ref 20–32)
CREAT SERPL-MCNC: 0.89 MG/DL (ref 0.66–1.25)
DIFFERENTIAL METHOD BLD: ABNORMAL
EOSINOPHIL # BLD AUTO: 0.3 10E9/L (ref 0–0.7)
EOSINOPHIL NFR BLD AUTO: 3.1 %
ERYTHROCYTE [DISTWIDTH] IN BLOOD BY AUTOMATED COUNT: 12.4 % (ref 10–15)
GFR SERPL CREATININE-BSD FRML MDRD: >90 ML/MIN/{1.73_M2}
GLUCOSE SERPL-MCNC: 124 MG/DL (ref 70–99)
HCT VFR BLD AUTO: 38.5 % (ref 40–53)
HGB BLD-MCNC: 12.8 G/DL (ref 13.3–17.7)
IMM GRANULOCYTES # BLD: 0 10E9/L (ref 0–0.4)
IMM GRANULOCYTES NFR BLD: 0.2 %
LYMPHOCYTES # BLD AUTO: 1 10E9/L (ref 0.8–5.3)
LYMPHOCYTES NFR BLD AUTO: 11.7 %
MCH RBC QN AUTO: 29.2 PG (ref 26.5–33)
MCHC RBC AUTO-ENTMCNC: 33.2 G/DL (ref 31.5–36.5)
MCV RBC AUTO: 88 FL (ref 78–100)
MONOCYTES # BLD AUTO: 0.7 10E9/L (ref 0–1.3)
MONOCYTES NFR BLD AUTO: 8.8 %
NEUTROPHILS # BLD AUTO: 6.2 10E9/L (ref 1.6–8.3)
NEUTROPHILS NFR BLD AUTO: 75.8 %
NRBC # BLD AUTO: 0 10*3/UL
NRBC BLD AUTO-RTO: 0 /100
PLATELET # BLD AUTO: 258 10E9/L (ref 150–450)
POTASSIUM SERPL-SCNC: 3.9 MMOL/L (ref 3.4–5.3)
PROT SERPL-MCNC: 6.2 G/DL (ref 6.8–8.8)
RBC # BLD AUTO: 4.39 10E12/L (ref 4.4–5.9)
SODIUM SERPL-SCNC: 139 MMOL/L (ref 133–144)
WBC # BLD AUTO: 8.2 10E9/L (ref 4–11)

## 2021-03-15 PROCEDURE — 120N000006 HC R&B PEDS

## 2021-03-15 PROCEDURE — 36415 COLL VENOUS BLD VENIPUNCTURE: CPT | Performed by: INTERNAL MEDICINE

## 2021-03-15 PROCEDURE — 250N000013 HC RX MED GY IP 250 OP 250 PS 637: Performed by: INTERNAL MEDICINE

## 2021-03-15 PROCEDURE — 85025 COMPLETE CBC W/AUTO DIFF WBC: CPT | Performed by: INTERNAL MEDICINE

## 2021-03-15 PROCEDURE — 258N000003 HC RX IP 258 OP 636: Performed by: INTERNAL MEDICINE

## 2021-03-15 PROCEDURE — 250N000011 HC RX IP 250 OP 636: Performed by: INTERNAL MEDICINE

## 2021-03-15 PROCEDURE — 99232 SBSQ HOSP IP/OBS MODERATE 35: CPT | Performed by: INTERNAL MEDICINE

## 2021-03-15 PROCEDURE — 80053 COMPREHEN METABOLIC PANEL: CPT | Performed by: INTERNAL MEDICINE

## 2021-03-15 RX ADMIN — KETOROLAC TROMETHAMINE 30 MG: 30 INJECTION, SOLUTION INTRAMUSCULAR at 08:44

## 2021-03-15 RX ADMIN — FAMOTIDINE 20 MG: 20 TABLET ORAL at 20:05

## 2021-03-15 RX ADMIN — ACETAMINOPHEN 650 MG: 325 TABLET, FILM COATED ORAL at 08:44

## 2021-03-15 RX ADMIN — POTASSIUM CHLORIDE, DEXTROSE MONOHYDRATE AND SODIUM CHLORIDE: 150; 5; 450 INJECTION, SOLUTION INTRAVENOUS at 22:28

## 2021-03-15 RX ADMIN — POTASSIUM CHLORIDE, DEXTROSE MONOHYDRATE AND SODIUM CHLORIDE: 150; 5; 450 INJECTION, SOLUTION INTRAVENOUS at 12:44

## 2021-03-15 RX ADMIN — POTASSIUM CHLORIDE, DEXTROSE MONOHYDRATE AND SODIUM CHLORIDE: 150; 5; 450 INJECTION, SOLUTION INTRAVENOUS at 03:15

## 2021-03-15 RX ADMIN — KETOROLAC TROMETHAMINE 30 MG: 30 INJECTION, SOLUTION INTRAMUSCULAR at 01:52

## 2021-03-15 RX ADMIN — KETOROLAC TROMETHAMINE 30 MG: 30 INJECTION, SOLUTION INTRAMUSCULAR at 22:29

## 2021-03-15 RX ADMIN — KETOROLAC TROMETHAMINE 30 MG: 30 INJECTION, SOLUTION INTRAMUSCULAR at 16:37

## 2021-03-15 RX ADMIN — FAMOTIDINE 20 MG: 20 TABLET ORAL at 08:38

## 2021-03-15 NOTE — PROGRESS NOTES
[facility]  Hospitalist Progress Note  Yue Brown MD 03/15/21  Text Page  Pager: [phone] (7am-6pm)    Reason for Stay (Diagnosis): pancreatitis         Assessment and Plan:      Summary of Stay: Jeremy Hermosillo is a 55 year old male without significant past medical history who was admitted on 3/9/2021 with refractory nausea with vomiting, exquisite abdominal pain found to have acute pancreatitis.  No biliary dilatation on imaging including ultrasound and LFTs unremarkable.  Denies ETOH use and trig levels OK.  Cause of pancreatitis remains unclear.     Subsequently developed ileus but has now had several BMs.  Overall very slowly improving, encouraged small and frequent clears today.    Problem List/Assessment and Plan:     Idiopathic pancreatitis  He is not a heavy drinker.  no evidence of gallstones.  Trig level OK.  No recent trauma, on chronic omeprazole which is listed as potential culprit but very rare.  He had right upper quadrant ultrasound which showed no cholelithiasis or biliary dilation.  CT showed acute interstitial edematous pancreatitis with a slightly more focal area of dense pancreatic tissue near the pancreatic head (likely represents an area of focal fatty sparing as there is diffuse fatty infiltration of the pancreas however continued follow-up is recommended to assess the stability of this area).  - Pepcid BID  -GI consulted to assist with etiology.  ? EUS as outpt.  IgG checked to evaluate for possible autoimmune pancreatitis (results pending)  - continue clear liquids, discussed with the patient that he needs to go slowly as taking a large amount in for dinner last night made his pain and bloating worse  - If no improvement may need to consider repeat CT scan tomorrow   - Continue antiemetics PRN, IVF, Tylenol, Toradol, Oxycodone PRN     Ileus - Resolved:  Secondary to above.  Continue clear liquid diet.  continue stool softeners and mobilizing in halls.  Will improve  "as pancreatis resolves.  He has now had several soft BMs overnight.      Acute mid-back pain - Improved:  Suspect due to pancreatitis.  Worse when lying flat on his back.  Pain medications prn, overall this is much better than admission.    Leukocytosis - Resolved: Likely stress response due to acute pancreatitis.  No evidence of infection.  Resolved.  No indication for antibiotics.    Diet: Clear Liquid Diet    DVT Prophylaxis: Pneumatic Compression Devices  Li Catheter: not present  Code Status: Full Code      Disposition Plan   Expected discharge: 2 - 3 days, recommended to prior living arrangement once diet advanced, pain improving.  Entered: Yue Brown MD 03/15/2021, 9:38 AM       The patient's care was discussed with the Bedside Nurse, Patient and GI Consultant.    Hospitalist [facility]          Interval History (Subjective):      Patient was discussed with her bedside nurse this morning.  I also evaluated the patient with ANGELITO Monk, with GI.  The patient has had several loose stools overnight.  He did eat clear liquids last night and felt bloated after this.  He had several different things all at once and admits that he tried to eat a \"regular\" meal when he was doing this.  Continues to have epigastric pain but overall it is much better than admission.  Toradol really helps diminish the pain.  No nausea or vomiting.  No fevers or chills.  So far today sipping on clears and this is not making his abdominal symptoms any worse.                  Physical Exam:      Last Vital Signs:  /81 (BP Location: Right arm)   Pulse 99   Temp 98.3  F (36.8  C) (Oral)   Resp 18   Ht 1.753 m (5' 9\")   Wt 102.1 kg (225 lb)   SpO2 95%   BMI 33.23 kg/m      General: Alert, awake, no acute distress.  HEENT: Normocephalic and atraumatic, eyes anicteric and without scleral injection, EOMI, face symmetric, MMM.  Cardiac: RRR, normal S1, S2. No m/g/r, no LE " edema.  Pulmonary: Normal chest rise, normal work of breathing.  Lungs CTAB without crackles or wheezing.  Abdomen: soft, tender in the epigastric region with moderate palpation, slightly distended.  Normoactive bowel sounds, no guarding or rebound tenderness.  Extremities: no deformities.  Warm, well perfused.  Skin: no rashes or lesions.  Warm and Dry.  Neuro: No focal deficits.  Speech clear.  Coordination and strength grossly normal.  Psych: Alert and oriented x3. Appropriate affect.         Medications:      All current medications were reviewed with changes reflected in problem list.         Data:      All new lab and imaging data was reviewed.   Labs:  Recent Labs   Lab 03/15/21  0637 03/14/21  0700 03/13/21  0636    137 138   POTASSIUM 3.9 3.8 3.9   CHLORIDE 107 106 106   CO2 27 26 27   ANIONGAP 5 5 5   * 118* 125*   BUN 10 11 12   CR 0.89 0.81 0.72   GFRESTIMATED >90 >90 >90   GFRESTBLACK >90 >90 >90   MARIANA 8.2* 8.3* 8.2*     Recent Labs   Lab 03/15/21  0637 03/12/21  0636 03/11/21  0653   WBC 8.2 15.1* 16.9*   HGB 12.8* 14.6 14.9   HCT 38.5* 45.0 45.6   MCV 88 90 90    209 221      Imaging:   No results found for this or any previous visit (from the past 24 hour(s)).    Yue Brown MD

## 2021-03-15 NOTE — PLAN OF CARE
"Afebrile. VSS. Pain managed with Toradol. Abdomen obese/distended, soft, tender. BS active and audible all quads. Passing flatus. Tolerating clears, but felt he ate \"too much\" and felt \"bloated\". Voiding. No BM. Senna given. Ambulated in halls.   "

## 2021-03-15 NOTE — PROGRESS NOTES
"[facility]  [facility]  Gastroenterology Progress Note    Interval History:    Epigastric pain and abdominal distention are still present, but slowly improving over the past few days. Felt bloated after clear liquid dinner last night, but thinks he drank too much at once. Passed multiple loose, brown/yellow stools overnight.    Physical Exam:    /81 (BP Location: Right arm)   Pulse 99   Temp 98.3  F (36.8  C) (Oral)   Resp 18   Ht 1.753 m (5' 9\")   Wt 102.1 kg (225 lb)   SpO2 95%   BMI 33.23 kg/m    Temp (24hrs), Av.4  F (36.9  C), Min:98.3  F (36.8  C), Max:98.5  F (36.9  C)    No data found.    Intake/Output Summary (Last 24 hours) at 3/15/2021 0929  Last data filed at 3/15/2021 0600  Gross per 24 hour   Intake 1141.7 ml   Output 875 ml   Net 266.7 ml       Constitutional: No acute distress.  Cardiovascular: RRR.  Respiratory: Nonlabored.  Abdomen: Soft, mild distention, epigastric tenderness.  Skin: No jaundice.    Additional Comments:  ROS, FH, SH: See initial GI consult for details.    Laboratory Data:  Recent Labs   Lab Test 03/15/21  0637 21  0636 21  0653   WBC 8.2 15.1* 16.9*   HGB 12.8* 14.6 14.9   MCV 88 90 90    209 221     Recent Labs   Lab Test 03/15/21  0637 21  0700 21  0636    137 138   POTASSIUM 3.9 3.8 3.9   CHLORIDE 107 106 106   CO2 27 26 27   BUN 10 11 12   CR 0.89 0.81 0.72   ANIONGAP 5 5 5   MARIANA 8.2* 8.3* 8.2*     Recent Labs   Lab Test 03/15/21  0637 21  0700 21  0636 21  0653 21  2102   ALBUMIN 2.5*  --  2.8*  --  3.4   BILITOTAL 0.9  --  0.9  --  0.3   ALT 36  --  20  --  28   AST 27  --  12  --  17   ALKPHOS 92  --  93  --  94   LIPASE  --  78 113 1,185* 10,540*       Imaging / Endoscopy:    RUQ US 3/10/21   - No cholelithiasis, pericholecystic fluid or gallbladder wall thickening.   - Diffuse fatty infiltration of the liver.   - No biliary dilatation.   - No hydronephrosis.   - " Pancreas not visualized due to bowel gas.    CTAP 3/10/21   - Evidence for acute interstitial edematous pancreatitis without significant ductal dilatation. Slightly more focal area of dense pancreatic tissue near the pancreatic head likely represents an area of focal fatty sparing as there is diffuse fatty infiltration of the pancreas. However, continued follow-up is recommended to assess for stability of this area.   - Minimal acute peripancreatic fluid conforming to the borders of the retroperitoneum. No evidence of pancreatic necrosis or peripancreatic necrosis.   - Hepatic steatosis.   - Colonic diverticulosis without diverticulitis.   - Mild reactive wall thickening of the first and second portions of the duodenum due to the pancreatitis.   - Fat-containing bilateral inguinal hernias, greater on the right.    AXR 3/14/21   - Upright and supine views of the abdomen and pelvis were obtained. Gas-filled colonic loops, otherwise nonobstructive bowel gas pattern. No free peritoneal or portal venous gas. Mild bibasilar pulmonary opacities, likely atelectasis.    Assessment & Plan:  Jeremy Hermosillo is a 55-year-old male who was admitted 3/9/21 with nausea, vomiting, and abdominal pain. Found to have elevated lipase >10k and CT evidence of acute pancreatitis without necrosis or pseudocyst.    1) Acute idiopathic pancreatitis: Etiology remains unclear. No stones or ductal dilation on US. No history of inciting meds or alcohol use. TGs elevated but not significantly. IgG4 pending to rule out autoimmune etiology. Patient improving, but slowly.       - Follow up IgG4.       - Clear liquid diet. Encouraged patient to go slow with this.       - Consider CT scan tomorrow if his pain persists / unable to advance.    Discussed with Dr. Huber and Dr. Brown.    Heena Hernandez PA-C  [facility] ([facility])

## 2021-03-15 NOTE — PLAN OF CARE
Vital signs: Stable; B/P: 138/81, Temp: 98.3, HR: 99, RR: 18  Pain Control: Toradol PRN.   Diet: Tolerating small sips of clear liquids.   Output: Voiding adequately. Continues to have loose stools.  Activity: Up ambulating in [place] x2 independently. Resting comfortable in chair between cares.    Updates: Abdomen round and tender in mid epigastric area. BS hyperactive. Bottom sore from loose stools; Barrier cream provided.   Plan: Continue to monitor and assess VS/pain.

## 2021-03-15 NOTE — PLAN OF CARE
Has slept most of the night. Woke for vital signs and  then stated his abdominal pain was 5. Offered clear liquids but declined. Gave him Toradol. Note B/P 149/87 and .Had a large loose brown stool.  Bowel sounds hyperactive. Abdomen remains distended and tender to touch.Fell back to sleep. IV infusing at 100 ml's. AM lab draw. Monitor pain and medicate as needed. Remains on clear liquid diet.

## 2021-03-16 LAB
ANION GAP SERPL CALCULATED.3IONS-SCNC: 7 MMOL/L (ref 3–14)
BUN SERPL-MCNC: 8 MG/DL (ref 7–30)
CALCIUM SERPL-MCNC: 8.8 MG/DL (ref 8.5–10.1)
CHLORIDE SERPL-SCNC: 109 MMOL/L (ref 94–109)
CO2 SERPL-SCNC: 24 MMOL/L (ref 20–32)
CREAT SERPL-MCNC: 0.8 MG/DL (ref 0.66–1.25)
GFR SERPL CREATININE-BSD FRML MDRD: >90 ML/MIN/{1.73_M2}
GLUCOSE SERPL-MCNC: 127 MG/DL (ref 70–99)
IGG SERPL-MCNC: 735 MG/DL (ref 610–1616)
IGG1 SER-MCNC: 369 MG/DL (ref 382–929)
IGG2 SER-MCNC: 255 MG/DL (ref 242–700)
IGG3 SER-MCNC: 44 MG/DL (ref 22–176)
IGG4 SER-MCNC: 15 MG/DL (ref 4–86)
POTASSIUM SERPL-SCNC: 4.1 MMOL/L (ref 3.4–5.3)
SODIUM SERPL-SCNC: 140 MMOL/L (ref 133–144)

## 2021-03-16 PROCEDURE — 250N000011 HC RX IP 250 OP 636: Performed by: INTERNAL MEDICINE

## 2021-03-16 PROCEDURE — 80048 BASIC METABOLIC PNL TOTAL CA: CPT | Performed by: INTERNAL MEDICINE

## 2021-03-16 PROCEDURE — 250N000013 HC RX MED GY IP 250 OP 250 PS 637: Performed by: INTERNAL MEDICINE

## 2021-03-16 PROCEDURE — 258N000003 HC RX IP 258 OP 636: Performed by: INTERNAL MEDICINE

## 2021-03-16 PROCEDURE — 99232 SBSQ HOSP IP/OBS MODERATE 35: CPT | Performed by: INTERNAL MEDICINE

## 2021-03-16 PROCEDURE — 120N000006 HC R&B PEDS

## 2021-03-16 PROCEDURE — 36415 COLL VENOUS BLD VENIPUNCTURE: CPT | Performed by: INTERNAL MEDICINE

## 2021-03-16 RX ADMIN — KETOROLAC TROMETHAMINE 30 MG: 30 INJECTION, SOLUTION INTRAMUSCULAR at 05:09

## 2021-03-16 RX ADMIN — KETOROLAC TROMETHAMINE 30 MG: 30 INJECTION, SOLUTION INTRAMUSCULAR at 23:32

## 2021-03-16 RX ADMIN — POTASSIUM CHLORIDE, DEXTROSE MONOHYDRATE AND SODIUM CHLORIDE: 150; 5; 450 INJECTION, SOLUTION INTRAVENOUS at 16:29

## 2021-03-16 RX ADMIN — POTASSIUM CHLORIDE, DEXTROSE MONOHYDRATE AND SODIUM CHLORIDE: 150; 5; 450 INJECTION, SOLUTION INTRAVENOUS at 05:12

## 2021-03-16 RX ADMIN — FAMOTIDINE 20 MG: 20 TABLET ORAL at 20:31

## 2021-03-16 RX ADMIN — FAMOTIDINE 20 MG: 20 TABLET ORAL at 10:00

## 2021-03-16 NOTE — PLAN OF CARE
Vital Signs: Afebrile. Maintaining sats on RA.   Pain/Comfort: Intermittent cramping; subsides after BM.   Assessment: Abdomen rounded but soft. Hyperactive bowel sounds. Passing flatus. Bilateral lower lung lobes diminished; using IS.   Diet: Tolerating clear liquid diet; advanced to full liquids.  Output: Voiding. 2 loose stools.   Activity/Ambulation: Ambulating hallway independently.   Social: Spouse at the bedside; supportive. Pleasant with RN.   Plan: IVF. Pain control. Encourage ambulation. GL consulting. Monitor and provide for needs.

## 2021-03-16 NOTE — PLAN OF CARE
Vital Signs: Afebrile. Slight HTN. Maintaining sats on RA.   Pain/Comfort: Abdominal pain controlled with Toradol.   Assessment: Abdomen rounded but soft. Hyperactive bowel sounds. Passing flatus. Bilateral lower lung lobes diminished; using IS.   Diet: Tolerating clear liquid diet.   Output: Voiding. 1-2 loose stools per pt.   Activity/Ambulation: Ambulated hallway times two independently.   Social: No visitors this evening. Pleasant with RN.   Plan: IVF. Pain control. Encourage ambulation. Clear liquid diet as tolerated. AM labs. Monitor and provide for needs.

## 2021-03-16 NOTE — PROGRESS NOTES
"CLINICAL NUTRITION SERVICES  -  ASSESSMENT NOTE    Recommendations Ordered by Registered Dietitian (RD):   Diet advancement per MD/GI   Ordered Gelatein Plus BID    Malnutrition:   % Weight Loss: unable to assess - no weight hx   % Intake:  </= 50% for >/= 5 days (severe malnutrition)  Subcutaneous Fat Loss:  None observed  Muscle Loss:  None observed  Fluid Retention:  None noted    Malnutrition Diagnosis: Patient does not meet two of the above criteria necessary for diagnosing malnutrition     REASON FOR ASSESSMENT  Jeremy Hermosillo is a 55 year old male seen by Registered Dietitian for LOS    NUTRITION HISTORY  - Information obtained from patient and chart   - Patient admitted for idiopathic pancreatitis   - No significant past medical history   - Typical diet at home: regular diet   - Typical food/fluid intake PTA: pt states that he was eating well PTA, no changes to PO intake. Typically consumes 3 meals per day + snacks  - Supplements: none   - Chewing/swallowing difficulty: none, previously had difficulty swallowing (food getting \"stuck\" in his throat - thus the reason for the omeprazole). No issues for many years.   - Food allergies: grape (artificial) flavor     CURRENT NUTRITION ORDERS  Diet Order:     Clear Liquids     Current Intake/Tolerance:  Upon review of the flowsheets pt consumed 100% of on meal ordered.     NUTRITION FOCUSED PHYSICAL ASSESSMENT FOR DIAGNOSING MALNUTRITION)  Yes              Observed:    No nutrition-related physical findings observed    Obtained from Chart/Interdisciplinary Team:  - GI is following per their note on 3/14: \"If ongoing pain, inability to tolerate clear liquids tomorrow, would consider repeat CT to evaluate for necrosis and consider either enteral (via post-pyloric TF) or potentially TPN.\"     ANTHROPOMETRICS  Height: 5' 9\"  Weight: 102.1 kg ( 225 lbs 0 oz)   Body mass index is 33.23 kg/m .  Weight Status:  Obesity Grade I BMI 30-34.9  Weight History: no recent weight " history to comment on. Pt states that his weight has increased over the past 1 year, currently around 220-225 lbs.   Wt Readings from Last 10 Encounters:   03/09/21 102.1 kg (225 lb)     LABS  Labs reviewed    Electrolytes  Potassium (mmol/L)   Date Value   03/16/2021 4.1   03/15/2021 3.9   03/14/2021 3.8    Blood Glucose  Glucose (mg/dL)   Date Value   03/16/2021 127 (H)   03/15/2021 124 (H)   03/14/2021 118 (H)   03/13/2021 125 (H)   03/12/2021 136 (H)     Hemoglobin A1C (%)   Date Value   03/09/2021 5.7 (H)    Inflammatory Markers  WBC (10e9/L)   Date Value   03/15/2021 8.2   03/12/2021 15.1 (H)   03/11/2021 16.9 (H)     Albumin (g/dL)   Date Value   03/15/2021 2.5 (L)   03/12/2021 2.8 (L)   03/09/2021 3.4      Sodium (mmol/L)   Date Value   03/16/2021 140   03/15/2021 139   03/14/2021 137    Renal  Urea Nitrogen (mg/dL)   Date Value   03/16/2021 8   03/15/2021 10   03/14/2021 11     Creatinine (mg/dL)   Date Value   03/16/2021 0.80   03/15/2021 0.89   03/14/2021 0.81     Additional  Triglycerides (mg/dL)   Date Value   03/09/2021 273 (H)        MEDICATIONS  Medications reviewed    famotidine  20 mg Oral BID     polyethylene glycol  17 g Oral Daily     senna-docusate  1 tablet Oral BID    Or     senna-docusate  2 tablet Oral BID     sodium chloride (PF)  3 mL Intracatheter Q8H        dextrose 5% and 0.45% NaCl + KCl 20 mEq/L 100 mL/hr at 03/16/21 0832      acetaminophen, bisacodyl **OR** bisacodyl **OR** bisacodyl, bisacodyl, ketorolac, lidocaine 4%, lidocaine (buffered or not buffered), magnesium hydroxide, melatonin, naloxone **OR** naloxone **OR** naloxone **OR** naloxone, ondansetron **OR** ondansetron, oxyCODONE, prochlorperazine, sodium chloride (PF), zolpidem     ASSESSED NUTRITION NEEDS PER APPROVED PRACTICE GUIDELINES:    Dosing Weight 102.1 kg   Estimated Energy Needs: 1093-3236 kcals (20-25 Kcal/Kg), MSJ = 2214 kcal (AF 1.2)   Justification: maintenance  Estimated Protein Needs: 102-123 grams protein  (1-1.2 g pro/Kg)  Justification: maintenance  Estimated Fluid Needs: per MD     MALNUTRITION:  % Weight Loss: unable to assess - no weight hx   % Intake:  </= 50% for >/= 5 days (severe malnutrition)  Subcutaneous Fat Loss:  None observed  Muscle Loss:  None observed  Fluid Retention:  None noted    Malnutrition Diagnosis: Patient does not meet two of the above criteria necessary for diagnosing malnutrition    NUTRITION DIAGNOSIS:  Inadequate oral intake related to increased abdominal pain in the setting of idiopathic pancreatitis as evidenced by pt likely consuming <50% of nutritional needs over the past >5 days.     NUTRITION INTERVENTIONS  Recommendations / Nutrition Prescription  Diet advancement per MD/GI   Ordered Gelatein Plus BID     Implementation  Nutrition education: Provided education on the different oral nutritional supplements offered during admission. Also explained the role of the RD  Medical Food Supplement: as above    Nutrition Goals  Diet to advance >/=fulls in the next 24-48 hours vs alternative nutrition support (EN)    MONITORING AND EVALUATION:  Progress towards goals will be monitored and evaluated per protocol and Practice Guidelines    Angela Singh, RD, LD  Clinical Dietitian

## 2021-03-16 NOTE — PROGRESS NOTES
[facility]  Hospitalist Progress Note  Yue Brown MD 03/16/21   Text Page  Pager: [phone] (7am-6pm)    Reason for Stay (Diagnosis): pancreatitis         Assessment and Plan:      Summary of Stay: Jeremy Hermosillo is a 55 year old male without significant past medical history who was admitted on 3/9/2021 with refractory nausea with vomiting, exquisite abdominal pain found to have acute pancreatitis.  No biliary dilatation on imaging including ultrasound and LFTs unremarkable.  Denies ETOH use and trig levels OK.  Cause of pancreatitis remains unclear.     Subsequently developed ileus but has now had several BMs.  Has turned the corner and pain improving, advancing diet today.    Problem List/Assessment and Plan:     Idiopathic pancreatitis  He is not a heavy drinker.  no evidence of gallstones.  Trig level OK.  No recent trauma, on chronic omeprazole which is listed as potential culprit but very rare.  He had right upper quadrant ultrasound which showed no cholelithiasis or biliary dilation.  CT showed acute interstitial edematous pancreatitis with a slightly more focal area of dense pancreatic tissue near the pancreatic head (likely represents an area of focal fatty sparing as there is diffuse fatty infiltration of the pancreas however continued follow-up is recommended to assess the stability of this area).  He is finally improving and tolerating clears, advancing diet today.  - Pepcid BID  -GI consulted to assist with etiology.  ? EUS as outpt.  IgG checked to evaluate for possible autoimmune pancreatitis and this was negative  - Advance to full liquids  - Continue antiemetics PRN, IVF, Tylenol, Toradol, Oxycodone PRN     Ileus - Resolved:  Secondary to above. Will improve as pancreatis resolves.  Now having soft bowel movements.     Acute mid-back pain - Improved:  Suspect due to pancreatitis.  Worse when lying flat on his back.  Pain medications prn, overall this is much better than  "admission.    Leukocytosis - Resolved: Likely stress response due to acute pancreatitis.  No evidence of infection.  Resolved.  No indication for antibiotics.    Diet: Full Liquid Diet    DVT Prophylaxis: Pneumatic Compression Devices  Li Catheter: not present  Code Status: Full Code      Disposition Plan   Expected discharge: 1-2 days, recommended to prior living arrangement once diet advanced, pain improving.  Entered: Yue Brown MD 03/16/2021, 1:04 PM       The patient's care was discussed with the Bedside Nurse, Patient and Patient's Family.    Hospitalist Service  [facility]          Interval History (Subjective):      Patient was discussed with her bedside nurse this morning.  Patient was seen with his wife at the bedside as well.  He states that overall he is feeling better than yesterday.  He continues to have loose bowel movements.  He will have cramping and burning sensation which resolved after the bowel movement or gas.  His epigastric pain is improving.  He is tolerating clears.  GI evaluated him earlier today and has advanced his diet, he has not tried this yet.  Denies chest pain or shortness of breath.                  Physical Exam:      Last Vital Signs:  /68   Pulse 66   Temp 98.3  F (36.8  C) (Oral)   Resp 16   Ht 1.753 m (5' 9\")   Wt 102.1 kg (225 lb)   SpO2 95%   BMI 33.23 kg/m      General: Alert, awake, no acute distress.  HEENT: Normocephalic and atraumatic, eyes anicteric and without scleral injection, EOMI, face symmetric, MMM.  Cardiac: RRR, normal S1, S2. No m/g/r, no LE edema.  Pulmonary: Normal chest rise, normal work of breathing.  Lungs CTAB without crackles or wheezing.  Abdomen: soft, very mild tenderness in epigastric region with deep palpation, slightly distended but soft.  Normoactive bowel sounds, no guarding or rebound tenderness.  Extremities: no deformities.  Warm, well perfused.  Skin: no rashes or lesions.  Warm and Dry.  Neuro: " No focal deficits.  Speech clear.  Coordination and strength grossly normal.  Psych: Alert and oriented x3. Appropriate affect.         Medications:      All current medications were reviewed with changes reflected in problem list.         Data:      All new lab and imaging data was reviewed.   Labs:  Recent Labs   Lab 03/16/21  0927 03/15/21  0637 03/14/21  0700    139 137   POTASSIUM 4.1 3.9 3.8   CHLORIDE 109 107 106   CO2 24 27 26   ANIONGAP 7 5 5   * 124* 118*   BUN 8 10 11   CR 0.80 0.89 0.81   GFRESTIMATED >90 >90 >90   GFRESTBLACK >90 >90 >90   MARIANA 8.8 8.2* 8.3*     Recent Labs   Lab 03/15/21  0637 03/12/21  0636 03/11/21  0653   WBC 8.2 15.1* 16.9*   HGB 12.8* 14.6 14.9   HCT 38.5* 45.0 45.6   MCV 88 90 90    209 221      Imaging:   No results found for this or any previous visit (from the past 24 hour(s)).    Yue Brown MD

## 2021-03-16 NOTE — PROGRESS NOTES
"[facility]  [facility]  Gastroenterology Progress Note    Interval History:    Pain improving today. Had multiple loose stools overnight and today. Has some associated burning abdominal pain that resolves with BM. No black or bloody stools. Tolerating clear liquids.    Physical Exam:    /68   Pulse 66   Temp 98.3  F (36.8  C) (Oral)   Resp 16   Ht 1.753 m (5' 9\")   Wt 102.1 kg (225 lb)   SpO2 95%   BMI 33.23 kg/m    Temp (24hrs), Av.4  F (36.9  C), Min:98.3  F (36.8  C), Max:98.5  F (36.9  C)    No data found.    Intake/Output Summary (Last 24 hours) at 3/15/2021 0929  Last data filed at 3/15/2021 0600  Gross per 24 hour   Intake 1141.7 ml   Output 875 ml   Net 266.7 ml       Constitutional: No acute distress.  Cardiovascular: RRR.  Respiratory: Nonlabored.  Abdomen: Soft, mild distention, epigastric tenderness.  Skin: No jaundice.    Additional Comments:  ROS, FH, SH: See initial GI consult for details.    Laboratory Data:  Recent Labs   Lab Test 03/15/21  0637 21  0636 21  0653   WBC 8.2 15.1* 16.9*   HGB 12.8* 14.6 14.9   MCV 88 90 90    209 221     Recent Labs   Lab Test 21  0927 03/15/21  0637 21  0700    139 137   POTASSIUM 4.1 3.9 3.8   CHLORIDE 109 107 106   CO2 24 27 26   BUN 8 10 11   CR 0.80 0.89 0.81   ANIONGAP 7 5 5   MARIANA 8.8 8.2* 8.3*     Recent Labs   Lab Test 03/15/21  0637 21  0700 21  0636 21  0653 21  2102   ALBUMIN 2.5*  --  2.8*  --  3.4   BILITOTAL 0.9  --  0.9  --  0.3   ALT 36  --  20  --  28   AST 27  --  12  --  17   ALKPHOS 92  --  93  --  94   LIPASE  --  78 113 1,185* 10,540*       Imaging / Endoscopy:    RUQ US 3/10/21   - No cholelithiasis, pericholecystic fluid or gallbladder wall thickening.   - Diffuse fatty infiltration of the liver.   - No biliary dilatation.   - No hydronephrosis.   - Pancreas not visualized due to bowel gas.    CTAP 3/10/21   - Evidence for acute " interstitial edematous pancreatitis without significant ductal dilatation. Slightly more focal area of dense pancreatic tissue near the pancreatic head likely represents an area of focal fatty sparing as there is diffuse fatty infiltration of the pancreas. However, continued follow-up is recommended to assess for stability of this area.   - Minimal acute peripancreatic fluid conforming to the borders of the retroperitoneum. No evidence of pancreatic necrosis or peripancreatic necrosis.   - Hepatic steatosis.   - Colonic diverticulosis without diverticulitis.   - Mild reactive wall thickening of the first and second portions of the duodenum due to the pancreatitis.   - Fat-containing bilateral inguinal hernias, greater on the right.    AXR 3/14/21   - Upright and supine views of the abdomen and pelvis were obtained. Gas-filled colonic loops, otherwise nonobstructive bowel gas pattern. No free peritoneal or portal venous gas. Mild bibasilar pulmonary opacities, likely atelectasis.    Assessment & Plan:  Jeremy Hermosillo is a 55-year-old male who was admitted 3/9/21 with nausea, vomiting, and abdominal pain. Found to have elevated lipase >10k and CT evidence of acute pancreatitis without necrosis or pseudocyst.    1) Acute idiopathic pancreatitis: Etiology remains unclear. No stones or ductal dilation on US. No history of inciting meds or alcohol use. TGs elevated but not significantly. IgG4 normal. Patient improving, but slowly.       - Advance to full liquid diet. If tolerated, can advance further to low fat diet. Still encouraged patient to go slow.       - RD following; appreciate assistance. Recommend shakes/supplements.       - EUS 6 weeks for further workup of idiopathic pancreatitis. [facility] will arrange.    2) Diarrhea: May be related to pancreatitis. AXR 3/13 unremarkable. Stools starting to firm up slightly today. Should monitor; consider cultures/C.diff test if diarrhea persists.    Discussed with   Mayo.    Heena Hernandez PA-C  [facility] ([facility])

## 2021-03-16 NOTE — PLAN OF CARE
Stated he got up 3 times with urgency to  have a BM. Stool was loose yellow/brown colored. Bowel sounds active and passing gas. Abdomen is now soft. Tolerated water.Toradol given once with discomfort of 2 in lower abdomen and appeared to be sleeping after. Also said he had some back pain. Diminished in lung bases.O2 SATs 90 -93%. Encouraged to use IS when awake. Has an occasional cough. IV infusing at 100 ml/hr. Monitor bowel function. Medicated for pain as needed. Scheduled for AM labs.

## 2021-03-17 VITALS
BODY MASS INDEX: 33.33 KG/M2 | DIASTOLIC BLOOD PRESSURE: 76 MMHG | RESPIRATION RATE: 16 BRPM | WEIGHT: 225 LBS | OXYGEN SATURATION: 96 % | HEART RATE: 64 BPM | SYSTOLIC BLOOD PRESSURE: 127 MMHG | TEMPERATURE: 98.1 F | HEIGHT: 69 IN

## 2021-03-17 PROCEDURE — 258N000003 HC RX IP 258 OP 636: Performed by: INTERNAL MEDICINE

## 2021-03-17 PROCEDURE — 250N000013 HC RX MED GY IP 250 OP 250 PS 637: Performed by: INTERNAL MEDICINE

## 2021-03-17 PROCEDURE — 99239 HOSP IP/OBS DSCHRG MGMT >30: CPT | Performed by: INTERNAL MEDICINE

## 2021-03-17 RX ADMIN — FAMOTIDINE 20 MG: 20 TABLET ORAL at 09:39

## 2021-03-17 RX ADMIN — POTASSIUM CHLORIDE, DEXTROSE MONOHYDRATE AND SODIUM CHLORIDE: 150; 5; 450 INJECTION, SOLUTION INTRAVENOUS at 01:07

## 2021-03-17 NOTE — PLAN OF CARE
"Denied Abdominal pain and appeared to sleep most of the night.  No headache. No nausea. Tolerated water. Said he had small soft brown stool which was \"more like my normal.\"  Using barrier cream and Avril to clean rectal area. IV infusing at 100 ml/hr. Monitor I&O.   "

## 2021-03-17 NOTE — PLAN OF CARE
Vital signs: Stable; B/P: 131/76, Temp: 98.4, HR: 79, RR: 16  Pain Control: Rating pain 1-2/10; declining pain medications.  Diet: Tolerating full liquids. Can have shakes and supplements per RD.  Output: Voiding adequately. Continues to have loose stools.  Activity: Up ambulating independently in room and halls.  Updates: Abdomen rounded and tender to the touch in upper epigastric area. BS hyperactive.   Plan: Continue to monitor and assess VS/pain.

## 2021-03-17 NOTE — PLAN OF CARE
Vital Signs: VSS. Afebrile.  Pain/Comfort: Denies pain.  Assessment: WDL  Diet: Tolerated low fiber diet  Output: Voiding. Soft stool x1.  Activity/Ambulation: Up independently in room. Ambulated boyle.  Social:Wife here to visit.  Plan: Discharge instructions given. Home diet explained. Discharge home with wife.

## 2021-03-17 NOTE — DISCHARGE SUMMARY
[facility]  Discharge Summary  Name: Jeremy Hermosillo    MRN: [id number]  YOB: 1965    Age: 55 year old  Date of Discharge:  3/17/2021  Date of Admission: 3/9/2021  Primary Care Provider: Emre Winslow  Discharge Physician:  Yue Brown MD  Discharging Service:  Hospitalist      Discharge Diagnoses:  1. Idiopathic pancreatitis  2. Ileus  3. Abdominal Pain  4. Leukocytosis     Follow-ups Needed After Discharge   Follow up with [facility] in about 6 weeks for EUS    Unresulted Labs Ordered in the Past 30 Days of this Admission     No orders found from 10/16/2018 to 12/16/2018.        Hospital Course:  Jeremy Hermosillo is a 55 year old male without significant past medical history who was admitted on 3/9/2021 with refractory nausea with vomiting, exquisite abdominal pain found to have acute pancreatitis.  No biliary dilatation on imaging including ultrasound and LFTs unremarkable.  Denies ETOH use and trig levels OK.  Cause of pancreatitis remains unclear.     Subsequently developed ileus but has now had several BMs.  Slowly improved any by day of discharge pain had nearly resolved and was tolerating low fiber diet.     Idiopathic pancreatitis  He is not a heavy drinker.  no evidence of gallstones.  Trig level OK.  No recent trauma, on chronic omeprazole which is listed as potential culprit but very rare.  He had right upper quadrant ultrasound which showed no cholelithiasis or biliary dilation.  CT showed acute interstitial edematous pancreatitis with a slightly more focal area of dense pancreatic tissue near the pancreatic head (likely represents an area of focal fatty sparing as there is diffuse fatty infiltration of the pancreas however continued follow-up is recommended to assess the stability of this area).  IgG negative for autoimmune pancreatitis. Had significant pain and bloating with lead to slow advancement of diet but ultimately was able to tolerate low fiber diet on day of discharge  "with minimal pain.  Will need to follow up with [facility] in clinic for EUS in about 6 weeks.       Ileus - Resolved:  Secondary to above. Will improve as pancreatis resolves.  Now having soft bowel movements.     Acute mid-back pain - Improved:  Suspect due to pancreatitis.  Worse when lying flat on his back.  Pain medications prn, overall this is much better than admission and no longer requiring pain medications for this.     Leukocytosis - Resolved: Likely stress response due to acute pancreatitis.  No evidence of infection.  Resolved.  No indication for antibiotics.     Discharge Disposition:  Discharged to home     Allergies:  Allergies   Allergen Reactions     Grape (Artificial) Flavor Anaphylaxis        Condition on Discharge:  Discharge condition: Stable   Discharge vitals: Blood pressure 127/76, pulse 64, temperature 98.1  F (36.7  C), temperature source Oral, resp. rate 16, height 1.753 m (5' 9\"), weight 102.1 kg (225 lb), SpO2 96 %.   Code status on discharge: Full Code     History of Illness:  See detailed admission note for full details.    Physical Exam:  Blood pressure 127/76, pulse 64, temperature 98.1  F (36.7  C), temperature source Oral, resp. rate 16, height 1.753 m (5' 9\"), weight 102.1 kg (225 lb), SpO2 96 %.  Wt Readings from Last 1 Encounters:   03/09/21 102.1 kg (225 lb)     General: Alert, awake, no acute distress.  HEENT: Normocephalic, atraumatic, eyes anicteric and without scleral injection, EOMI, MMM.  Cardiac: RRR, normal S1, S2.  No m/g/r. No LE edema.  Pulmonary: Normal chest rise, normal work of breathing.  Lungs CTAB  Abdomen: soft, non-tender, non-distended.  Normoactive BS.  No guarding or rebound tenderness.  Extremities: no deformities.  Warm, well perfused.  Skin: no rashes or lesions noted.  Warm and Dry.  Neuro: No focal deficits noted.  Speech clear.  Coordination and strength grossly normal.  Psych: Appropriate affect. Alert and oriented x3    Procedures other than " Imaging:  IVF  Phlebotomy     Imaging:  Results for orders placed or performed during the hospital encounter of 03/09/21   US Abdomen Limited    Narrative    EXAM: US ABDOMEN LIMITED  LOCATION: [facility]  DATE/TIME: 3/10/2021 12:26 AM    INDICATION: Right upper quadrant abdominal pain  COMPARISON: None.  TECHNIQUE: Limited abdominal ultrasound.    FINDINGS:    GALLBLADDER: Normal. No gallstones, wall thickening, or pericholecystic fluid. Negative sonographic Navarro's sign.    BILE DUCTS: No biliary dilatation. The common duct measures 3 mm.    LIVER: Diffuse fatty infiltration of the liver. No definitive evidence for focal mass. Portal vein patent on color Doppler imaging.    RIGHT KIDNEY: 12.6 cm in length. No hydronephrosis.    PANCREAS: Not visualized due to bowel gas.    Segmentally visualized proximal aorta and IVC normal in caliber. No ascites.      Impression    IMPRESSION:  1.  No cholelithiasis, pericholecystic fluid or gallbladder wall thickening.    2.  Diffuse fatty infiltration of the liver.    3.  No biliary dilatation.    4.  No hydronephrosis.    5.  Pancreas not visualized due to bowel gas.       CT Abdomen Pelvis w Contrast    Narrative    EXAM: CT ABDOMEN AND PELVIS WITH CONTRAST  LOCATION: [facility]  DATE/TIME: 03/10/2021, 3:43 AM    INDICATION: Pancreatitis, acute, severe. 55-year-old male with central abdominal pain.  COMPARISON: Ultrasound 03/10/2021.  TECHNIQUE: CT scan of the abdomen and pelvis was performed following injection of IV contrast. Multiplanar reformats were obtained. Dose reduction techniques were used.  CONTRAST: 100 mL Isovue-370.    FINDINGS:   LOWER CHEST: Minimal atelectasis in the lung bases. No pleural fluid.    HEPATOBILIARY: Hepatic steatosis. No biliary dilatation. Gallbladder unremarkable.    PANCREAS: Evidence for acute interstitial edematous pancreatitis. No pancreatic ductal dilatation. Slightly more focal area of dense pancreatic  tissue near the pancreatic head likely represents an area of focal fatty sparing as there is diffuse fatty   infiltration of the pancreas. This measures 2.5 cm (series 3, image 90 and series 4, image 46). No evidence for pancreatic or peripancreatic necrosis. Minimal acute peripancreatic fluid conforming to the borders of the retroperitoneum.    SPLEEN: Normal.    ADRENAL GLANDS: Normal.    KIDNEYS/BLADDER: Normal.    BOWEL: Colonic diverticulosis without diverticulitis. Appendix unremarkable. No small bowel dilatation. Minimal inflammatory edema in the duodenal/pancreatic groove with some mild reactive wall thickening involving the second portion of the duodenum and   first portion of the duodenum.    LYMPH NODES: No lymphadenopathy.    VASCULATURE: Normal caliber abdominal aorta. Atherosclerotic vascular calcification.    PELVIC ORGANS: Fat-containing bilateral inguinal hernias, greater on the right.    MUSCULOSKELETAL: No significant osseous abnormality.      Impression    IMPRESSION:   1.  Evidence for acute interstitial edematous pancreatitis without significant ductal dilatation. Slightly more focal area of dense pancreatic tissue near the pancreatic head likely represents an area of focal fatty sparing as there is diffuse fatty   infiltration of the pancreas. However, continued follow-up is recommended to assess for stability of this area.    2.  Minimal acute peripancreatic fluid conforming to the borders of the retroperitoneum. No evidence of pancreatic necrosis or peripancreatic necrosis.    3.  Hepatic steatosis.    4.  Colonic diverticulosis without diverticulitis.    5.  Mild reactive wall thickening of the first and second portions of the duodenum due to the pancreatitis.    6.  Fat-containing bilateral inguinal hernias, greater on the right.     XR Abdomen 2 Views    Narrative    ABDOMEN TWO VIEWS   3/14/2021 9:33 AM     HISTORY: Pancreatitis, evaluate for ileus.    COMPARISON: CT abdomen/pelvis on  3/10/2021      Impression    IMPRESSION: Upright and supine views of the abdomen and pelvis were  obtained. Gas-filled colonic loops, otherwise nonobstructive bowel gas  pattern. No free peritoneal or portal venous gas. Mild bibasilar  pulmonary opacities, likely atelectasis.    RAJINDER FAIRBANKS MD        Consultations:  Consultations This Hospital Stay   GASTROENTEROLOGY IP CONSULT     Recent Lab Results:  Recent Labs   Lab 03/15/21  0637 03/12/21  0636 03/11/21  0653   WBC 8.2 15.1* 16.9*   HGB 12.8* 14.6 14.9   HCT 38.5* 45.0 45.6   MCV 88 90 90    209 221     Recent Labs   Lab 03/16/21  0927 03/15/21  0637 03/14/21  0700 03/12/21  0636 03/12/21  0636    139 137   < > 135   POTASSIUM 4.1 3.9 3.8   < > 3.9   CHLORIDE 109 107 106   < > 104   CO2 24 27 26   < > 25   ANIONGAP 7 5 5   < > 6   * 124* 118*   < > 136*   BUN 8 10 11   < > 8   CR 0.80 0.89 0.81   < > 0.79   GFRESTIMATED >90 >90 >90   < > >90   GFRESTBLACK >90 >90 >90   < > >90   MARIANA 8.8 8.2* 8.3*   < > 8.6   PROTTOTAL  --  6.2*  --   --  6.8   ALBUMIN  --  2.5*  --   --  2.8*   BILITOTAL  --  0.9  --   --  0.9   ALKPHOS  --  92  --   --  93   AST  --  27  --   --  12   ALT  --  36  --   --  20    < > = values in this interval not displayed.     No results for input(s): LACT in the last 168 hours.  Recent Labs   Lab 03/14/21  0700 03/12/21  0636 03/11/21  0653   LIPASE 78 113 1,185*          Pending Results:    Unresulted Labs Ordered in the Past 30 Days of this Admission     No orders found from 2/7/2021 to 3/10/2021.           Discharge Instructions and Follow-Up:   Discharge Orders      Reason for your hospital stay    You were hospitalized for severe abdominal pain and were found to have acute pancreatitis of unknown cause.  Throughout your stay you slowly improved and ultimately her diet was advanced to a low fiber diet.  You will need to follow-up with [facility] in clinic for an endoscopic ultrasound in approximately 6 weeks  to further evaluate the pancreas given your unknown pancreatitis.     Follow-up and recommended labs and tests     Routine follow-up with primary care.  Follow-up with [facility] in approximately 6 weeks for endoscopic ultrasound, they will call to schedule this.     Activity    Your activity upon discharge: activity as tolerated     Full Code     Diet    Follow this diet upon discharge: Orders Placed This Encounter      Snacks/Supplements Adult: Other; homemade smoothie with beneprotein; Between Meals      Low Fiber Diet      Small and frequent meals until your abdominal symptoms have completely resolved     Discharge Medications   Current Discharge Medication List      CONTINUE these medications which have NOT CHANGED    Details   ibuprofen (ADVIL/MOTRIN) 200 MG tablet Take 600 mg by mouth every 6 hours as needed for mild pain      multivitamin w/minerals (THERA-VIT-M) tablet Take 1 tablet by mouth daily      omeprazole 20 MG tablet Take 40 mg by mouth daily             Time Spent on this Encounter   I, Yue Brown MD, personally saw the patient today and spent greater than 30 minutes discharging this patient.    Yue Brown MD

## 2021-04-23 ENCOUNTER — IMMUNIZATION (OUTPATIENT)
Dept: NURSING | Facility: CLINIC | Age: 56
End: 2021-04-23
Payer: COMMERCIAL

## 2021-04-23 PROCEDURE — 91300 PR COVID VAC PFIZER DIL RECON 30 MCG/0.3 ML IM: CPT

## 2021-04-23 PROCEDURE — 0001A PR COVID VAC PFIZER DIL RECON 30 MCG/0.3 ML IM: CPT

## 2021-05-14 ENCOUNTER — IMMUNIZATION (OUTPATIENT)
Dept: NURSING | Facility: CLINIC | Age: 56
End: 2021-05-14
Attending: INTERNAL MEDICINE
Payer: COMMERCIAL

## 2021-05-14 PROCEDURE — 91300 PR COVID VAC PFIZER DIL RECON 30 MCG/0.3 ML IM: CPT

## 2021-05-14 PROCEDURE — 0002A PR COVID VAC PFIZER DIL RECON 30 MCG/0.3 ML IM: CPT

## 2021-05-15 ENCOUNTER — HEALTH MAINTENANCE LETTER (OUTPATIENT)
Age: 56
End: 2021-05-15

## 2021-09-04 ENCOUNTER — HEALTH MAINTENANCE LETTER (OUTPATIENT)
Age: 56
End: 2021-09-04

## 2021-11-10 ENCOUNTER — HOSPITAL ENCOUNTER (EMERGENCY)
Facility: CLINIC | Age: 56
Discharge: HOME | End: 2021-11-10
Attending: EMERGENCY MEDICINE | Admitting: EMERGENCY MEDICINE
Payer: COMMERCIAL

## 2021-11-10 VITALS
SYSTOLIC BLOOD PRESSURE: 128 MMHG | TEMPERATURE: 98.3 F | DIASTOLIC BLOOD PRESSURE: 86 MMHG | RESPIRATION RATE: 18 BRPM | WEIGHT: 207 LBS | BODY MASS INDEX: 30.57 KG/M2 | OXYGEN SATURATION: 94 % | HEART RATE: 73 BPM

## 2021-11-10 DIAGNOSIS — T78.40XA ALLERGIC REACTION, INITIAL ENCOUNTER: ICD-10-CM

## 2021-11-10 PROCEDURE — 250N000011 HC RX IP 250 OP 636: Performed by: EMERGENCY MEDICINE

## 2021-11-10 PROCEDURE — 250N000013 HC RX MED GY IP 250 OP 250 PS 637: Performed by: EMERGENCY MEDICINE

## 2021-11-10 PROCEDURE — 99284 EMERGENCY DEPT VISIT MOD MDM: CPT | Mod: 25

## 2021-11-10 PROCEDURE — 250N000012 HC RX MED GY IP 250 OP 636 PS 637: Performed by: EMERGENCY MEDICINE

## 2021-11-10 PROCEDURE — 250N000009 HC RX 250: Performed by: EMERGENCY MEDICINE

## 2021-11-10 PROCEDURE — 96375 TX/PRO/DX INJ NEW DRUG ADDON: CPT

## 2021-11-10 PROCEDURE — 96374 THER/PROPH/DIAG INJ IV PUSH: CPT

## 2021-11-10 RX ORDER — PREDNISONE 20 MG/1
TABLET ORAL
Qty: 6 TABLET | Refills: 0 | Status: SHIPPED | OUTPATIENT
Start: 2021-11-11

## 2021-11-10 RX ORDER — PREDNISONE 20 MG/1
60 TABLET ORAL ONCE
Status: COMPLETED | OUTPATIENT
Start: 2021-11-10 | End: 2021-11-10

## 2021-11-10 RX ORDER — CETIRIZINE HYDROCHLORIDE 10 MG/1
10 TABLET ORAL ONCE
Status: COMPLETED | OUTPATIENT
Start: 2021-11-10 | End: 2021-11-10

## 2021-11-10 RX ORDER — PREDNISONE 20 MG/1
20 TABLET ORAL ONCE
Status: DISCONTINUED | OUTPATIENT
Start: 2021-11-10 | End: 2021-11-10

## 2021-11-10 RX ORDER — EPINEPHRINE 0.3 MG/.3ML
0.3 INJECTION SUBCUTANEOUS
Qty: 2 EACH | Refills: 0 | Status: SHIPPED | OUTPATIENT
Start: 2021-11-10

## 2021-11-10 RX ORDER — METHYLPREDNISOLONE SODIUM SUCCINATE 125 MG/2ML
125 INJECTION, POWDER, LYOPHILIZED, FOR SOLUTION INTRAMUSCULAR; INTRAVENOUS ONCE
Status: COMPLETED | OUTPATIENT
Start: 2021-11-10 | End: 2021-11-10

## 2021-11-10 RX ORDER — DIPHENHYDRAMINE HYDROCHLORIDE 50 MG/ML
25 INJECTION INTRAMUSCULAR; INTRAVENOUS ONCE
Status: COMPLETED | OUTPATIENT
Start: 2021-11-10 | End: 2021-11-10

## 2021-11-10 RX ADMIN — METHYLPREDNISOLONE SODIUM SUCCINATE 125 MG: 125 INJECTION, POWDER, FOR SOLUTION INTRAMUSCULAR; INTRAVENOUS at 20:40

## 2021-11-10 RX ADMIN — PREDNISONE 60 MG: 20 TABLET ORAL at 21:22

## 2021-11-10 RX ADMIN — FAMOTIDINE 20 MG: 10 INJECTION, SOLUTION INTRAVENOUS at 20:34

## 2021-11-10 RX ADMIN — DIPHENHYDRAMINE HYDROCHLORIDE 25 MG: 50 INJECTION INTRAMUSCULAR; INTRAVENOUS at 20:40

## 2021-11-10 RX ADMIN — CETIRIZINE HYDROCHLORIDE 10 MG: 10 TABLET, FILM COATED ORAL at 21:23

## 2021-11-10 ASSESSMENT — ENCOUNTER SYMPTOMS
FACIAL SWELLING: 1
ROS SKIN COMMENTS: ITCHING
SHORTNESS OF BREATH: 0
COLOR CHANGE: 1
ABDOMINAL PAIN: 1

## 2021-11-11 NOTE — DISCHARGE INSTRUCTIONS
Continue taking Benadryl 50 mg (2 tablets) every 6 hours until tomorrow then if your symptoms are still resolved tomorrow, then you can decrease the Benadryl to 25 mg, then you can stop Benadryl if symptoms are still resolved.    Start the Prednisone tomorrow since you had today's dose today.

## 2021-11-11 NOTE — ED TRIAGE NOTES
Lanreotide injection today at 3 pm, and now having itching, abd pain, and subjective facial swelling, no lip or tongue involvement.

## 2021-11-11 NOTE — ED PROVIDER NOTES
History   Chief Complaint:  Allergic Reaction     The history is provided by the patient.      Jeremy Hermosillo is a 56 year old male with history of pancreatic cancer who presents with allergic reaction. The patient received his first lanreotide injection for pancreatic cancer today about five hours ago around 1500. Two hours afterward he started to develop abdominal pain. He then felt his cheeks starting to swell. He also notes sneezing and itching under his armpits. He took two benadryl pills about two hours ago and he is feeling improved. His tongue felt swollen for a little bit but the benadryl helped. No trouble breathing or rash.     Review of Systems   HENT: Positive for facial swelling and sneezing.    Respiratory: Negative for shortness of breath.    Gastrointestinal: Positive for abdominal pain.   Skin: Positive for color change. Negative for rash.        Itching   Allergic/Immunologic:        Lanreotide allergy    All other systems reviewed and are negative.    Allergies:  Grape    Medications:  Omeprazole   Lanreotide     Past Medical History:     GERD  Hypertriglyceridemia  Idiopathic acute pancreatitis  Secondary malignant neoplasm liver  Gastroparesis  Anemia  Malignant neoplasm of pancreas islet cell    Past Surgical History:    Hypospadias correction   Pancreatectomy whipple standard  Transversus abdominis plane block     Family History:    Mother: COPD, CAD  Father: alcoholism, stroke  Sister: leukemia   Son: asthma    Social History:  Presents to ED with wife    Physical Exam     Patient Vitals for the past 24 hrs:   BP Temp Temp src Pulse Resp SpO2 Weight   11/10/21 2055 -- -- -- -- -- 95 % --   11/10/21 2046 -- -- -- -- -- 95 % --   11/10/21 1924 (!) 133/93 98.3  F (36.8  C) Temporal 91 18 95 % 93.9 kg (207 lb)       Physical Exam  Nursing note and vitals reviewed.  Constitutional:  Appears well-developed and well-nourished.   HENT:    Slight facial flushing. No obvious facial or oral swelling.    Head:    Atraumatic.   Mouth/Throat:   Oropharynx is clear and moist. No oropharyngeal exudate.   Eyes:    Pupils are equal, round, and reactive to light.   Neck:    Normal range of motion. Neck supple.      No tracheal deviation present. No thyromegaly present.   Cardiovascular:  Normal rate, regular rhythm, no murmur   Pulmonary/Chest: Breath sounds are clear and equal without wheezes or crackles.  Abdominal:   Soft. Bowel sounds are normal. Exhibits no distension and      no mass. There is no tenderness.      There is no rebound and no guarding.   Musculoskeletal:  Exhibits no edema.   Lymphadenopathy:  No cervical adenopathy.   Neurological:   Alert and oriented to person, place, and time.   Skin:    Skin is warm and dry. No rash noted. No pallor. Slight facial flushing.     Emergency Department Course   Emergency Department Course:  Reviewed:  I reviewed nursing notes, vitals, past medical history and Care Everywhere    Assessments:  1958 I obtained history and examined the patient as noted above.   2129 I rechecked the patient and explained findings. His symptoms are resolved.    Interventions:  2034 Pepcid, 20 mg, IV  2040 Benadryl, 25 mg, IV  2040 Solu-medrol, 125 mg, IV  2122 Deltasone, 60 mg, PO  2123 Zyrtec, 10 mg, PO    Disposition:  The patient was discharged to home.     Impression & Plan       Medical Decision Making:  Jeremy Hermosillo is a 56 year old male who presents for evaluation of an allergic reaction.  Their symptoms are most consistent with allergic phenomena.  I do not appreciate  signs of angioedema, respiratory compromise, shock, serious systemic reaction, etc.  There are no signs of serious infection, cellulitis, vasculitis, or other skin manifestation of a serious underlying disease.  The patient responded well to symptomatic medication while in the Emergency Department. Supportive outpatient management is indicated, epinephrine and prednisone provided for discharge noted above.  Close  follow up with primary care physician.  Return if  wheezing, progressive shortness of breath, facial or throat/tongue swelling. Full anticipatory guidance given prior to discharge.     Diagnosis:    ICD-10-CM    1. Allergic reaction, initial encounter  T78.40XA        Discharge Medications:  New Prescriptions    EPINEPHRINE (ANY BX GENERIC EQUIV) 0.3 MG/0.3ML INJECTION 2-PACK    Inject 0.3 mLs (0.3 mg) into the muscle once as needed for anaphylaxis (allergic reaction)    PREDNISONE (DELTASONE) 20 MG TABLET    Take two tablets (= 40mg) each day for 3 days       Scribe Disclosure:  Geo POTTER, am serving as a scribe at 7:55 PM on 11/10/2021 to document services personally performed by Trista Varma MD based on my observations and the provider's statements to me.            Trista Varma MD  11/10/21 [id number]

## 2022-06-11 ENCOUNTER — HEALTH MAINTENANCE LETTER (OUTPATIENT)
Age: 57
End: 2022-06-11

## 2022-10-22 ENCOUNTER — HEALTH MAINTENANCE LETTER (OUTPATIENT)
Age: 57
End: 2022-10-22

## 2022-12-04 ENCOUNTER — HEALTH MAINTENANCE LETTER (OUTPATIENT)
Age: 57
End: 2022-12-04

## 2023-03-13 ENCOUNTER — APPOINTMENT (OUTPATIENT)
Dept: CT IMAGING | Facility: CLINIC | Age: 58
End: 2023-03-13
Attending: EMERGENCY MEDICINE
Payer: COMMERCIAL

## 2023-03-13 ENCOUNTER — HOSPITAL ENCOUNTER (EMERGENCY)
Facility: CLINIC | Age: 58
Discharge: HOME | End: 2023-03-14
Attending: EMERGENCY MEDICINE | Admitting: EMERGENCY MEDICINE
Payer: COMMERCIAL

## 2023-03-13 DIAGNOSIS — N20.1 URETEROLITHIASIS: ICD-10-CM

## 2023-03-13 DIAGNOSIS — R74.8 ELEVATED LIPASE: ICD-10-CM

## 2023-03-13 DIAGNOSIS — R11.2 NAUSEA AND VOMITING, UNSPECIFIED VOMITING TYPE: ICD-10-CM

## 2023-03-13 DIAGNOSIS — N23 RENAL COLIC: ICD-10-CM

## 2023-03-13 LAB
ALBUMIN SERPL BCG-MCNC: 4.6 G/DL (ref 3.5–5.2)
ALBUMIN UR-MCNC: 20 MG/DL
ALP SERPL-CCNC: 134 U/L (ref 40–129)
ALT SERPL W P-5'-P-CCNC: 42 U/L (ref 10–50)
ANION GAP SERPL CALCULATED.3IONS-SCNC: 16 MMOL/L (ref 7–15)
APPEARANCE UR: CLEAR
AST SERPL W P-5'-P-CCNC: 30 U/L (ref 10–50)
BASOPHILS # BLD AUTO: 0.1 10E3/UL (ref 0–0.2)
BASOPHILS NFR BLD AUTO: 0 %
BILIRUB SERPL-MCNC: 0.6 MG/DL
BILIRUB UR QL STRIP: NEGATIVE
BUN SERPL-MCNC: 15.4 MG/DL (ref 6–20)
CALCIUM SERPL-MCNC: 9.4 MG/DL (ref 8.6–10)
CHLORIDE SERPL-SCNC: 97 MMOL/L (ref 98–107)
COLOR UR AUTO: ABNORMAL
CREAT SERPL-MCNC: 0.91 MG/DL (ref 0.67–1.17)
DEPRECATED HCO3 PLAS-SCNC: 23 MMOL/L (ref 22–29)
EOSINOPHIL # BLD AUTO: 0 10E3/UL (ref 0–0.7)
EOSINOPHIL NFR BLD AUTO: 0 %
ERYTHROCYTE [DISTWIDTH] IN BLOOD BY AUTOMATED COUNT: 12 % (ref 10–15)
GFR SERPL CREATININE-BSD FRML MDRD: >90 ML/MIN/1.73M2
GLUCOSE SERPL-MCNC: 244 MG/DL (ref 70–99)
GLUCOSE UR STRIP-MCNC: >=1000 MG/DL
HCT VFR BLD AUTO: 46.3 % (ref 40–53)
HGB BLD-MCNC: 15.9 G/DL (ref 13.3–17.7)
HGB UR QL STRIP: NEGATIVE
IMM GRANULOCYTES # BLD: 0.1 10E3/UL
IMM GRANULOCYTES NFR BLD: 0 %
KETONES UR STRIP-MCNC: 60 MG/DL
LACTATE SERPL-SCNC: 2.8 MMOL/L (ref 0.7–2)
LACTATE SERPL-SCNC: 4 MMOL/L (ref 0.7–2)
LEUKOCYTE ESTERASE UR QL STRIP: NEGATIVE
LIPASE SERPL-CCNC: 276 U/L (ref 13–60)
LYMPHOCYTES # BLD AUTO: 0.8 10E3/UL (ref 0.8–5.3)
LYMPHOCYTES NFR BLD AUTO: 6 %
MCH RBC QN AUTO: 28.9 PG (ref 26.5–33)
MCHC RBC AUTO-ENTMCNC: 34.3 G/DL (ref 31.5–36.5)
MCV RBC AUTO: 84 FL (ref 78–100)
MONOCYTES # BLD AUTO: 0.4 10E3/UL (ref 0–1.3)
MONOCYTES NFR BLD AUTO: 3 %
MUCOUS THREADS #/AREA URNS LPF: PRESENT /LPF
NEUTROPHILS # BLD AUTO: 12.7 10E3/UL (ref 1.6–8.3)
NEUTROPHILS NFR BLD AUTO: 91 %
NITRATE UR QL: NEGATIVE
NRBC # BLD AUTO: 0 10E3/UL
NRBC BLD AUTO-RTO: 0 /100
PH UR STRIP: 6.5 [PH] (ref 5–7)
PLATELET # BLD AUTO: 283 10E3/UL (ref 150–450)
POTASSIUM SERPL-SCNC: 3.7 MMOL/L (ref 3.4–5.3)
PROT SERPL-MCNC: 7.7 G/DL (ref 6.4–8.3)
RBC # BLD AUTO: 5.5 10E6/UL (ref 4.4–5.9)
RBC URINE: <1 /HPF
SODIUM SERPL-SCNC: 136 MMOL/L (ref 136–145)
SP GR UR STRIP: 1.02 (ref 1–1.03)
UROBILINOGEN UR STRIP-MCNC: NORMAL MG/DL
WBC # BLD AUTO: 13.9 10E3/UL (ref 4–11)
WBC URINE: 1 /HPF

## 2023-03-13 PROCEDURE — 36415 COLL VENOUS BLD VENIPUNCTURE: CPT | Performed by: EMERGENCY MEDICINE

## 2023-03-13 PROCEDURE — 83690 ASSAY OF LIPASE: CPT | Performed by: EMERGENCY MEDICINE

## 2023-03-13 PROCEDURE — 80053 COMPREHEN METABOLIC PANEL: CPT | Performed by: EMERGENCY MEDICINE

## 2023-03-13 PROCEDURE — 99285 EMERGENCY DEPT VISIT HI MDM: CPT | Mod: 25

## 2023-03-13 PROCEDURE — 96374 THER/PROPH/DIAG INJ IV PUSH: CPT

## 2023-03-13 PROCEDURE — 258N000003 HC RX IP 258 OP 636: Performed by: EMERGENCY MEDICINE

## 2023-03-13 PROCEDURE — 83605 ASSAY OF LACTIC ACID: CPT | Performed by: EMERGENCY MEDICINE

## 2023-03-13 PROCEDURE — 250N000013 HC RX MED GY IP 250 OP 250 PS 637: Performed by: EMERGENCY MEDICINE

## 2023-03-13 PROCEDURE — 96375 TX/PRO/DX INJ NEW DRUG ADDON: CPT

## 2023-03-13 PROCEDURE — 81001 URINALYSIS AUTO W/SCOPE: CPT | Performed by: EMERGENCY MEDICINE

## 2023-03-13 PROCEDURE — 96361 HYDRATE IV INFUSION ADD-ON: CPT

## 2023-03-13 PROCEDURE — 250N000011 HC RX IP 250 OP 636: Performed by: EMERGENCY MEDICINE

## 2023-03-13 PROCEDURE — 74176 CT ABD & PELVIS W/O CONTRAST: CPT

## 2023-03-13 PROCEDURE — 85004 AUTOMATED DIFF WBC COUNT: CPT | Performed by: EMERGENCY MEDICINE

## 2023-03-13 RX ORDER — ONDANSETRON 2 MG/ML
4 INJECTION INTRAMUSCULAR; INTRAVENOUS
Status: COMPLETED | OUTPATIENT
Start: 2023-03-13 | End: 2023-03-13

## 2023-03-13 RX ORDER — SODIUM CHLORIDE 9 MG/ML
INJECTION, SOLUTION INTRAVENOUS CONTINUOUS
Status: DISCONTINUED | OUTPATIENT
Start: 2023-03-13 | End: 2023-03-13

## 2023-03-13 RX ORDER — TAMSULOSIN HYDROCHLORIDE 0.4 MG/1
0.4 CAPSULE ORAL ONCE
Status: COMPLETED | OUTPATIENT
Start: 2023-03-13 | End: 2023-03-13

## 2023-03-13 RX ORDER — MORPHINE SULFATE 4 MG/ML
4 INJECTION, SOLUTION INTRAMUSCULAR; INTRAVENOUS ONCE
Status: COMPLETED | OUTPATIENT
Start: 2023-03-13 | End: 2023-03-13

## 2023-03-13 RX ORDER — MORPHINE SULFATE 4 MG/ML
4 INJECTION, SOLUTION INTRAMUSCULAR; INTRAVENOUS
Status: DISCONTINUED | OUTPATIENT
Start: 2023-03-13 | End: 2023-03-14 | Stop reason: HOSPADM

## 2023-03-13 RX ORDER — KETOROLAC TROMETHAMINE 15 MG/ML
15 INJECTION, SOLUTION INTRAMUSCULAR; INTRAVENOUS ONCE
Status: COMPLETED | OUTPATIENT
Start: 2023-03-13 | End: 2023-03-13

## 2023-03-13 RX ADMIN — MORPHINE SULFATE 4 MG: 4 INJECTION, SOLUTION INTRAMUSCULAR; INTRAVENOUS at 21:15

## 2023-03-13 RX ADMIN — KETOROLAC TROMETHAMINE 15 MG: 15 INJECTION, SOLUTION INTRAMUSCULAR; INTRAVENOUS at 22:15

## 2023-03-13 RX ADMIN — MORPHINE SULFATE 4 MG: 4 INJECTION, SOLUTION INTRAMUSCULAR; INTRAVENOUS at 21:30

## 2023-03-13 RX ADMIN — ONDANSETRON 4 MG: 2 INJECTION INTRAMUSCULAR; INTRAVENOUS at 22:34

## 2023-03-13 RX ADMIN — SODIUM CHLORIDE 1000 ML: 9 INJECTION, SOLUTION INTRAVENOUS at 21:15

## 2023-03-13 RX ADMIN — SODIUM CHLORIDE 1000 ML: 9 INJECTION, SOLUTION INTRAVENOUS at 23:30

## 2023-03-13 RX ADMIN — TAMSULOSIN HYDROCHLORIDE 0.4 MG: 0.4 CAPSULE ORAL at 22:15

## 2023-03-13 ASSESSMENT — ACTIVITIES OF DAILY LIVING (ADL)
ADLS_ACUITY_SCORE: 35
ADLS_ACUITY_SCORE: 33

## 2023-03-13 ASSESSMENT — ENCOUNTER SYMPTOMS
BACK PAIN: 0
FEVER: 0
ABDOMINAL DISTENTION: 1
CHILLS: 0
HEMATURIA: 0
ABDOMINAL PAIN: 1

## 2023-03-14 VITALS
RESPIRATION RATE: 18 BRPM | OXYGEN SATURATION: 92 % | HEART RATE: 60 BPM | DIASTOLIC BLOOD PRESSURE: 69 MMHG | SYSTOLIC BLOOD PRESSURE: 114 MMHG | TEMPERATURE: 98.1 F

## 2023-03-14 LAB — LACTATE SERPL-SCNC: 2.8 MMOL/L (ref 0.7–2)

## 2023-03-14 PROCEDURE — 250N000013 HC RX MED GY IP 250 OP 250 PS 637: Performed by: EMERGENCY MEDICINE

## 2023-03-14 PROCEDURE — 96376 TX/PRO/DX INJ SAME DRUG ADON: CPT

## 2023-03-14 PROCEDURE — 36415 COLL VENOUS BLD VENIPUNCTURE: CPT | Performed by: EMERGENCY MEDICINE

## 2023-03-14 PROCEDURE — 96361 HYDRATE IV INFUSION ADD-ON: CPT

## 2023-03-14 PROCEDURE — 250N000011 HC RX IP 250 OP 636: Performed by: EMERGENCY MEDICINE

## 2023-03-14 PROCEDURE — 83605 ASSAY OF LACTIC ACID: CPT | Performed by: EMERGENCY MEDICINE

## 2023-03-14 RX ORDER — OXYCODONE HYDROCHLORIDE 5 MG/1
10 TABLET ORAL ONCE
Status: COMPLETED | OUTPATIENT
Start: 2023-03-14 | End: 2023-03-14

## 2023-03-14 RX ORDER — ONDANSETRON 4 MG/1
4 TABLET, ORALLY DISINTEGRATING ORAL EVERY 8 HOURS PRN
Qty: 10 TABLET | Refills: 0 | Status: SHIPPED | OUTPATIENT
Start: 2023-03-14 | End: 2023-03-17

## 2023-03-14 RX ORDER — OXYCODONE HYDROCHLORIDE 5 MG/1
5-10 TABLET ORAL EVERY 6 HOURS PRN
Qty: 12 TABLET | Refills: 0 | Status: SHIPPED | OUTPATIENT
Start: 2023-03-14 | End: 2023-03-17

## 2023-03-14 RX ORDER — TAMSULOSIN HYDROCHLORIDE 0.4 MG/1
0.4 CAPSULE ORAL DAILY
Qty: 10 CAPSULE | Refills: 0 | Status: SHIPPED | OUTPATIENT
Start: 2023-03-14

## 2023-03-14 RX ADMIN — OXYCODONE HYDROCHLORIDE 10 MG: 5 TABLET ORAL at 00:53

## 2023-03-14 RX ADMIN — MORPHINE SULFATE 4 MG: 4 INJECTION, SOLUTION INTRAMUSCULAR; INTRAVENOUS at 00:50

## 2023-03-14 ASSESSMENT — ACTIVITIES OF DAILY LIVING (ADL): ADLS_ACUITY_SCORE: 35

## 2023-03-14 NOTE — ED NOTES
Agree with triage- patient states his abdominal pain started suddenly around 1500. Patient has had Whipple attacks in the past but have never lasted this long. Patient is in significant pain, panting and moaning. IV placed.

## 2023-03-14 NOTE — ED PROVIDER NOTES
History     Chief Complaint:  Abdominal Pain     The history is provided by the patient and the spouse.      Jeremy Hermosillo is a 57 year old male with a history of liver and pancreatic cancer and GERD who presents with abdominal pain. Patient states that he had a sudden onset of lower abdominal pain around 1530 today and has been vomiting since. He states the pain is not radiating to his back. He states he has never had pain like this before. He also endorses abdominal bloating. Patient notes he had a Whipple procedure after pancreatis where tumors were found in his live and pancreas. His wife states that he get chemotherapy once a month now and is also on omeprazole. Patient still has his appendix. He reports no history of kidney stones or bowel obstruction. He reports no hematuria, chest pain, fever, or chills.     Independent Historian:   Wife    Review of External Notes: Reviewed epic notes.    ROS:  Review of Systems   Constitutional: Negative for chills and fever.   Gastrointestinal: Positive for abdominal distention and abdominal pain (lower).   Genitourinary: Negative for hematuria.   Musculoskeletal: Negative for back pain.   All other systems reviewed and are negative.    Allergies:  Lanreotide  Proanthocyanidin     Medications:    Prilosec  Sandostatin LAR Depot    Past Medical History:    Secondary Malignant Neoplasm Liver  Gastroparesis  Obesity  GERD  Malignant Neoplasm Of Pancreas Islet Cell  Hypertriglyceridemia  Idiopathic Acute Pancreatitis Without Necrosis Or Infection  Colon polyps     Past Surgical History:    Whipple Standard  Penis Surgery  Pancreatectomy  Block-Transversus Abdominis Plane     Family History:    Father: alcohol use disorder, stroke  Mother: CAD  Sister: Glioblastoma, Hairy Cell Leukemia     Social History:  Patient presents to the ED via private vehicle with his wife.   PCP: Emre Winslow     Physical Exam     Patient Vitals for the past 24 hrs:   BP Temp Temp src Pulse  Resp SpO2   03/14/23 0150 -- -- -- -- 18 --   03/14/23 0135 114/69 -- -- -- -- 92 %   03/14/23 0117 111/69 -- -- 60 -- 92 %   03/14/23 0056 130/72 -- -- -- -- 95 %   03/14/23 0049 125/71 -- -- -- -- 97 %   03/14/23 0029 (!) 141/71 -- -- 56 -- 97 %   03/13/23 2359 129/80 -- -- 54 -- 96 %   03/13/23 2349 117/68 -- -- -- -- 92 %   03/13/23 2247 130/80 -- -- 57 -- 93 %   03/13/23 2234 (!) 151/93 -- -- -- -- 94 %   03/13/23 2214 (!) 151/91 -- -- 73 -- 90 %   03/13/23 2204 (!) 144/84 -- -- -- -- 95 %   03/13/23 2157 (!) 144/83 98.1  F (36.7  C) Oral 58 -- 95 %   03/13/23 2132 -- -- -- -- -- 100 %   03/13/23 2127 -- -- -- -- -- 98 %   03/13/23 2122 -- -- -- -- -- 98 %   03/13/23 2053 (!) 158/93 (!) 95.4  F (35.2  C) Temporal 67 22 97 %      Physical Exam  General: Adult male, sitting upright, uncomfortable appearing.  Eyes: PERRL, Conjunctive within normal limits  ENT: Moist mucous membranes, oropharynx clear.   CV: Normal S1S2, no murmur, rub or gallop. Regular rate and rhythm  Resp: Clear to auscultation bilaterally, no wheezes, rales or rhonchi. Normal respiratory effort.  GI: Abdomen is soft and nondistended.  Diffuse left-sided tenderness to palpation.  No palpable masses. No rebound or guarding.  CVA tenderness to palpation on the left.  MSK: No edema. Nontender. Normal active range of motion.  Skin: Warm and diaphoretic. No rashes or lesions or ecchymoses on visible skin.  Neuro: Alert and oriented. Responds appropriately to all questions and commands. No focal findings appreciated. Normal muscle tone.  Psych: Appropriate mood and affect    Emergency Department Course     Imaging:  CT Abdomen Pelvis w/o Contrast   Final Result   IMPRESSION:    1.  Obstructing 1 to 2 mm right UVJ calculus.   2.  Multiple hepatic nodules on a background of steatosis. Direct comparison with prior studies versus follow-up dedicated hepatic MRI with and without IV contrast recommended to exclude metastatic disease.   3.  Status post  Whipple procedure with enlarged aortocaval node. Comparison with prior studies recommended.            Report per radiology    Laboratory:  Labs Ordered and Resulted from Time of ED Arrival to Time of ED Departure   COMPREHENSIVE METABOLIC PANEL - Abnormal       Result Value    Sodium 136      Potassium 3.7      Chloride 97 (*)     Carbon Dioxide (CO2) 23      Anion Gap 16 (*)     Urea Nitrogen 15.4      Creatinine 0.91      Calcium 9.4      Glucose 244 (*)     Alkaline Phosphatase 134 (*)     AST 30      ALT 42      Protein Total 7.7      Albumin 4.6      Bilirubin Total 0.6      GFR Estimate >90     LIPASE - Abnormal    Lipase 276 (*)    LACTIC ACID WHOLE BLOOD - Abnormal    Lactic Acid 4.0 (*)    ROUTINE UA WITH MICROSCOPIC REFLEX TO CULTURE - Abnormal    Color Urine Light Yellow      Appearance Urine Clear      Glucose Urine >=1000 (*)     Bilirubin Urine Negative      Ketones Urine 60 (*)     Specific Gravity Urine 1.016      Blood Urine Negative      pH Urine 6.5      Protein Albumin Urine 20 (*)     Urobilinogen Urine Normal      Nitrite Urine Negative      Leukocyte Esterase Urine Negative      Mucus Urine Present (*)     RBC Urine <1      WBC Urine 1     CBC WITH PLATELETS AND DIFFERENTIAL - Abnormal    WBC Count 13.9 (*)     RBC Count 5.50      Hemoglobin 15.9      Hematocrit 46.3      MCV 84      MCH 28.9      MCHC 34.3      RDW 12.0      Platelet Count 283      % Neutrophils 91      % Lymphocytes 6      % Monocytes 3      % Eosinophils 0      % Basophils 0      % Immature Granulocytes 0      NRBCs per 100 WBC 0      Absolute Neutrophils 12.7 (*)     Absolute Lymphocytes 0.8      Absolute Monocytes 0.4      Absolute Eosinophils 0.0      Absolute Basophils 0.1      Absolute Immature Granulocytes 0.1      Absolute NRBCs 0.0     LACTIC ACID WHOLE BLOOD - Abnormal    Lactic Acid 2.8 (*)    LACTIC ACID WHOLE BLOOD - Abnormal    Lactic Acid 2.8 (*)    LACTIC ACID WHOLE BLOOD      Emergency Department Course &  Assessments:     Interventions:  Medications   0.9% sodium chloride BOLUS (0 mLs Intravenous Stopped 3/13/23 2300)   morphine (PF) injection 4 mg (4 mg Intravenous $Given 3/13/23 2115)   ondansetron (ZOFRAN) injection 4 mg (4 mg Intravenous $Given 3/13/23 2234)   ketorolac (TORADOL) injection 15 mg (15 mg Intravenous $Given 3/13/23 2215)   tamsulosin (FLOMAX) capsule 0.4 mg (0.4 mg Oral $Given 3/13/23 2215)   0.9% sodium chloride BOLUS (0 mLs Intravenous Stopped 3/14/23 0136)   oxyCODONE (ROXICODONE) tablet 10 mg (10 mg Oral $Given 3/14/23 0053)      Independent Interpretation (X-rays, CTs, rhythm strip):  I reviewed the patient's CT.  Hydronephrosis and ureteral stone noted.    Assessments/Consultations/Discussion of Management or Tests:  ED Course as of 03/14/23 0155   Mon Mar 13, 2023   2114 I obtained history and examined the patient as noted above.    I reassessed the patient.  He is feeling much better.  He denies any new concerns.  He feels comfortable to plan for discharge home.  I discussed the elevated lipase which is nonspecific in this clinical setting with no localized tenderness to the epigastrium and alternative cause for symptoms.    Social Determinants of Health affecting care:   None    Disposition:  The patient was discharged to home.     Impression & Plan      Medical Decision Making:  Jeremy Hermosillo presented with unilateral flank and abdominal pain consistent with renal colic. CT confirms a ureteral stone. Pain is controlled with interventions in the Emergency Department. There is no fever or evidence of a urinary tract infection. The patient will be discharged with opioid analgesics and Ibuprofen for pain. Flomax will be prescribed daily to attempt to ease stone passage.   Zofran was prescribed for nausea.  I considered other etiologies for these symptoms including AAA and pyelonephritis but these are unlikely given the otherwise normal CT scan and urinalysis.  Lipase was elevated, but this does  not seem clinically consistent with pancreatitis.  This should be rechecked with his primary care provider.  The patient is instructed to return if increasing pain not controlled with pain meds, vomiting, and fever. Strain urine to look for stone, if detected, submit to primary doctor for lab analysis. Instructions were given to follow up with PCP and/or urology within one week, sooner if pain continues, as retrieval of the stone may be required for refractory symptoms.  All questions were answered prior to discharge    Diagnosis:    ICD-10-CM    1. Renal colic  N23       2. Ureterolithiasis  N20.1       3. Nausea and vomiting, unspecified vomiting type  R11.2       4. Elevated lipase  R74.8          Discharge Medications:  Discharge Medication List as of 3/14/2023  1:36 AM      START taking these medications    Details   ondansetron (ZOFRAN ODT) 4 MG ODT tab Take 1 tablet (4 mg) by mouth every 8 hours as needed for nausea or vomiting, Disp-10 tablet, R-0, Local Print      oxyCODONE (ROXICODONE) 5 MG tablet Take 1-2 tablets (5-10 mg) by mouth every 6 hours as needed for pain, Disp-12 tablet, R-0, Local Print      tamsulosin (FLOMAX) 0.4 MG capsule Take 1 capsule (0.4 mg) by mouth daily Or until pain resolves/stone passes, Disp-10 capsule, R-0, Local Print            Scribe Disclosure:  Marika POTTER, am serving as a scribe at 2:02 AM on 3/14/2023 to document services personally performed by Cindy Bonilla MD based on my observations and the provider's statements to me.   3/13/2023   Cindy Bonilla MD Jonkman, Tracy Dianne, MD  03/21/23 0631

## 2023-03-14 NOTE — ED TRIAGE NOTES
Reports lower abdominal pain since 1530 with nausea and vomiting.  Hx of whipple procedure 06/21.     Triage Assessment     Row Name 03/13/23 2051       Triage Assessment (Adult)    Airway WDL WDL       Respiratory WDL    Respiratory WDL WDL       Skin Circulation/Temperature WDL    Skin Circulation/Temperature WDL WDL       Cardiac WDL    Cardiac WDL WDL       Peripheral/Neurovascular WDL    Peripheral Neurovascular WDL WDL       Cognitive/Neuro/Behavioral WDL    Cognitive/Neuro/Behavioral WDL WDL

## 2023-07-22 ENCOUNTER — APPOINTMENT (OUTPATIENT)
Dept: CT IMAGING | Facility: CLINIC | Age: 58
End: 2023-07-22
Attending: EMERGENCY MEDICINE
Payer: COMMERCIAL

## 2023-07-22 ENCOUNTER — HOSPITAL ENCOUNTER (EMERGENCY)
Facility: CLINIC | Age: 58
Discharge: HOME | End: 2023-07-22
Attending: EMERGENCY MEDICINE | Admitting: EMERGENCY MEDICINE
Payer: COMMERCIAL

## 2023-07-22 VITALS
SYSTOLIC BLOOD PRESSURE: 150 MMHG | HEART RATE: 91 BPM | DIASTOLIC BLOOD PRESSURE: 94 MMHG | WEIGHT: 215.83 LBS | RESPIRATION RATE: 20 BRPM | BODY MASS INDEX: 31.87 KG/M2 | TEMPERATURE: 97.6 F | OXYGEN SATURATION: 91 %

## 2023-07-22 DIAGNOSIS — N23 RENAL COLIC: ICD-10-CM

## 2023-07-22 LAB
ALBUMIN SERPL BCG-MCNC: 4.2 G/DL (ref 3.5–5.2)
ALBUMIN UR-MCNC: 50 MG/DL
ALP SERPL-CCNC: 123 U/L (ref 40–129)
ALT SERPL W P-5'-P-CCNC: 34 U/L (ref 0–70)
ANION GAP SERPL CALCULATED.3IONS-SCNC: 13 MMOL/L (ref 7–15)
APPEARANCE UR: ABNORMAL
AST SERPL W P-5'-P-CCNC: 28 U/L (ref 0–45)
BASOPHILS # BLD AUTO: 0.1 10E3/UL (ref 0–0.2)
BASOPHILS NFR BLD AUTO: 1 %
BILIRUB SERPL-MCNC: 0.4 MG/DL
BILIRUB UR QL STRIP: NEGATIVE
BUN SERPL-MCNC: 14.5 MG/DL (ref 8–23)
CALCIUM SERPL-MCNC: 9.4 MG/DL (ref 8.6–10)
CAOX CRY #/AREA URNS HPF: ABNORMAL /HPF
CHLORIDE SERPL-SCNC: 101 MMOL/L (ref 98–107)
COLOR UR AUTO: YELLOW
CREAT SERPL-MCNC: 0.92 MG/DL (ref 0.67–1.17)
DEPRECATED HCO3 PLAS-SCNC: 24 MMOL/L (ref 22–29)
EOSINOPHIL # BLD AUTO: 0.1 10E3/UL (ref 0–0.7)
EOSINOPHIL NFR BLD AUTO: 2 %
ERYTHROCYTE [DISTWIDTH] IN BLOOD BY AUTOMATED COUNT: 12.2 % (ref 10–15)
GFR SERPL CREATININE-BSD FRML MDRD: >90 ML/MIN/1.73M2
GLUCOSE SERPL-MCNC: 188 MG/DL (ref 70–99)
GLUCOSE UR STRIP-MCNC: 100 MG/DL
HCT VFR BLD AUTO: 42.6 % (ref 40–53)
HGB BLD-MCNC: 14.7 G/DL (ref 13.3–17.7)
HGB UR QL STRIP: ABNORMAL
HOLD SPECIMEN: NORMAL
IMM GRANULOCYTES # BLD: 0 10E3/UL
IMM GRANULOCYTES NFR BLD: 1 %
KETONES UR STRIP-MCNC: ABNORMAL MG/DL
LEUKOCYTE ESTERASE UR QL STRIP: NEGATIVE
LIPASE SERPL-CCNC: 22 U/L (ref 13–60)
LYMPHOCYTES # BLD AUTO: 1.9 10E3/UL (ref 0.8–5.3)
LYMPHOCYTES NFR BLD AUTO: 24 %
MCH RBC QN AUTO: 29.5 PG (ref 26.5–33)
MCHC RBC AUTO-ENTMCNC: 34.5 G/DL (ref 31.5–36.5)
MCV RBC AUTO: 86 FL (ref 78–100)
MONOCYTES # BLD AUTO: 0.7 10E3/UL (ref 0–1.3)
MONOCYTES NFR BLD AUTO: 8 %
MUCOUS THREADS #/AREA URNS LPF: PRESENT /LPF
NEUTROPHILS # BLD AUTO: 5.3 10E3/UL (ref 1.6–8.3)
NEUTROPHILS NFR BLD AUTO: 64 %
NITRATE UR QL: NEGATIVE
NRBC # BLD AUTO: 0 10E3/UL
NRBC BLD AUTO-RTO: 0 /100
PH UR STRIP: 5.5 [PH] (ref 5–7)
PLATELET # BLD AUTO: 275 10E3/UL (ref 150–450)
POTASSIUM SERPL-SCNC: 3.5 MMOL/L (ref 3.4–5.3)
PROT SERPL-MCNC: 7.2 G/DL (ref 6.4–8.3)
RBC # BLD AUTO: 4.98 10E6/UL (ref 4.4–5.9)
RBC URINE: >182 /HPF
SODIUM SERPL-SCNC: 138 MMOL/L (ref 136–145)
SP GR UR STRIP: 1.03 (ref 1–1.03)
SQUAMOUS EPITHELIAL: 1 /HPF
UROBILINOGEN UR STRIP-MCNC: NORMAL MG/DL
WBC # BLD AUTO: 8.1 10E3/UL (ref 4–11)
WBC URINE: 10 /HPF
YEAST #/AREA URNS HPF: ABNORMAL /HPF

## 2023-07-22 PROCEDURE — 74176 CT ABD & PELVIS W/O CONTRAST: CPT

## 2023-07-22 PROCEDURE — 96375 TX/PRO/DX INJ NEW DRUG ADDON: CPT

## 2023-07-22 PROCEDURE — 96361 HYDRATE IV INFUSION ADD-ON: CPT

## 2023-07-22 PROCEDURE — 96376 TX/PRO/DX INJ SAME DRUG ADON: CPT

## 2023-07-22 PROCEDURE — 85025 COMPLETE CBC W/AUTO DIFF WBC: CPT | Performed by: EMERGENCY MEDICINE

## 2023-07-22 PROCEDURE — 93005 ELECTROCARDIOGRAM TRACING: CPT

## 2023-07-22 PROCEDURE — 258N000003 HC RX IP 258 OP 636: Performed by: EMERGENCY MEDICINE

## 2023-07-22 PROCEDURE — 250N000011 HC RX IP 250 OP 636: Mod: JZ | Performed by: EMERGENCY MEDICINE

## 2023-07-22 PROCEDURE — 96374 THER/PROPH/DIAG INJ IV PUSH: CPT

## 2023-07-22 PROCEDURE — 36415 COLL VENOUS BLD VENIPUNCTURE: CPT | Performed by: EMERGENCY MEDICINE

## 2023-07-22 PROCEDURE — 80053 COMPREHEN METABOLIC PANEL: CPT | Performed by: EMERGENCY MEDICINE

## 2023-07-22 PROCEDURE — 81001 URINALYSIS AUTO W/SCOPE: CPT | Performed by: EMERGENCY MEDICINE

## 2023-07-22 PROCEDURE — 99285 EMERGENCY DEPT VISIT HI MDM: CPT | Mod: 25

## 2023-07-22 PROCEDURE — 83690 ASSAY OF LIPASE: CPT | Performed by: EMERGENCY MEDICINE

## 2023-07-22 RX ORDER — ONDANSETRON 2 MG/ML
4 INJECTION INTRAMUSCULAR; INTRAVENOUS EVERY 30 MIN PRN
Status: DISCONTINUED | OUTPATIENT
Start: 2023-07-22 | End: 2023-07-22 | Stop reason: HOSPADM

## 2023-07-22 RX ORDER — ONDANSETRON 2 MG/ML
4 INJECTION INTRAMUSCULAR; INTRAVENOUS ONCE
Status: COMPLETED | OUTPATIENT
Start: 2023-07-22 | End: 2023-07-22

## 2023-07-22 RX ORDER — OXYCODONE AND ACETAMINOPHEN 5; 325 MG/1; MG/1
1 TABLET ORAL EVERY 6 HOURS PRN
Qty: 12 TABLET | Refills: 0 | Status: SHIPPED | OUTPATIENT
Start: 2023-07-22 | End: 2023-07-25

## 2023-07-22 RX ORDER — TAMSULOSIN HYDROCHLORIDE 0.4 MG/1
0.4 CAPSULE ORAL DAILY
Qty: 10 CAPSULE | Refills: 0 | Status: SHIPPED | OUTPATIENT
Start: 2023-07-22 | End: 2023-08-01

## 2023-07-22 RX ORDER — ONDANSETRON 2 MG/ML
4 INJECTION INTRAMUSCULAR; INTRAVENOUS ONCE
Status: DISCONTINUED | OUTPATIENT
Start: 2023-07-22 | End: 2023-07-22

## 2023-07-22 RX ORDER — ONDANSETRON 4 MG/1
4 TABLET, ORALLY DISINTEGRATING ORAL EVERY 8 HOURS PRN
Qty: 10 TABLET | Refills: 0 | Status: SHIPPED | OUTPATIENT
Start: 2023-07-22 | End: 2023-07-25

## 2023-07-22 RX ORDER — HYDROMORPHONE HYDROCHLORIDE 1 MG/ML
0.5 INJECTION, SOLUTION INTRAMUSCULAR; INTRAVENOUS; SUBCUTANEOUS EVERY 30 MIN PRN
Status: COMPLETED | OUTPATIENT
Start: 2023-07-22 | End: 2023-07-22

## 2023-07-22 RX ORDER — KETOROLAC TROMETHAMINE 15 MG/ML
10 INJECTION, SOLUTION INTRAMUSCULAR; INTRAVENOUS ONCE
Status: COMPLETED | OUTPATIENT
Start: 2023-07-22 | End: 2023-07-22

## 2023-07-22 RX ADMIN — HYDROMORPHONE HYDROCHLORIDE 0.5 MG: 1 INJECTION, SOLUTION INTRAMUSCULAR; INTRAVENOUS; SUBCUTANEOUS at 04:51

## 2023-07-22 RX ADMIN — ONDANSETRON 4 MG: 2 INJECTION INTRAMUSCULAR; INTRAVENOUS at 03:00

## 2023-07-22 RX ADMIN — HYDROMORPHONE HYDROCHLORIDE 0.5 MG: 1 INJECTION, SOLUTION INTRAMUSCULAR; INTRAVENOUS; SUBCUTANEOUS at 01:12

## 2023-07-22 RX ADMIN — KETOROLAC TROMETHAMINE 10 MG: 15 INJECTION, SOLUTION INTRAMUSCULAR; INTRAVENOUS at 01:13

## 2023-07-22 RX ADMIN — HYDROMORPHONE HYDROCHLORIDE 0.5 MG: 1 INJECTION, SOLUTION INTRAMUSCULAR; INTRAVENOUS; SUBCUTANEOUS at 03:01

## 2023-07-22 RX ADMIN — SODIUM CHLORIDE 1000 ML: 9 INJECTION, SOLUTION INTRAVENOUS at 01:12

## 2023-07-22 RX ADMIN — ONDANSETRON 4 MG: 2 INJECTION INTRAMUSCULAR; INTRAVENOUS at 04:25

## 2023-07-22 RX ADMIN — ONDANSETRON 4 MG: 2 INJECTION INTRAMUSCULAR; INTRAVENOUS at 01:13

## 2023-07-22 ASSESSMENT — ACTIVITIES OF DAILY LIVING (ADL)
ADLS_ACUITY_SCORE: 35
ADLS_ACUITY_SCORE: 33
ADLS_ACUITY_SCORE: 35

## 2023-07-22 NOTE — ED PROVIDER NOTES
History     Chief Complaint:  Abdominal Pain     The history is provided by the patient.      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 He has had bilateral swelling in his feet for two days. He denies vomiting. He notes this pain feels different from previous kidney stones. He notes he has an injection of octreotide once monthly.     Independent Historian:   None - Patient Only    Review of External Notes:   I reviewed the patient's ED note from 03/13/2023 where he was seen for a kidney stone.       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  Neuroendocrine tumor of pancreas  Myopia  Gastroparesis  S/P Whipple procedure  Home Enteral Nutrition  Hypophosphatemia  Blood loss anemia  Pancreas mass  Hypertriglyceridemia  Idiopathic Acute Pancreatitis Without Necrosis Or Infection  Fistula Pancreas  Ileus    Past Surgical History:    Hypospadias correction    Pancreatectomy whipple standard  Block transversus abdominis plane (tap)    Physical Exam     Patient Vitals for the past 24 hrs:   BP Temp Pulse Resp SpO2 Weight   07/22/23 0300 120/74 -- -- -- -- --   07/22/23 0123 (!) 142/84 97.6  F (36.4  C) -- -- 95 % --   07/22/23 0042 (!) 148/82 -- 51 20 99 % 97.9 kg (215 lb 13.3 oz)        Physical Exam  Constitutional:  Oriented to person, place, and time. In distress due to pain.   HENT:   Head:    Normocephalic.   Mouth/Throat:   Oropharynx is clear and moist.   Eyes:    EOM are normal. Pupils are equal, round, and reactive to light.   Neck:    Neck supple.   Cardiovascular:  Normal rate, regular rhythm and normal heart sounds.      Exam reveals no gallop and no friction rub.       No  murmur heard.  Pulmonary/Chest:  Effort normal and breath sounds normal.      No respiratory distress. No wheezes. No rales.      No reproducible chest wall pain.  Abdominal:   Soft. No distension. No tenderness. No rebound and no guarding. No pain to percussion of    flanks.   Musculoskeletal:  Normal range of motion. 2+ distal equal pulses.     No leg calf tenderness, swelling or edema.  Neurological:   Alert and oriented to person, place, and time.           Moves all 4 extremities spontaneously    Skin:    No rash noted. No pallor.     Emergency Department Course   ECG  ECG taken at 0119  Sinus bradycardia  Nonspecific T wave abnormality    Rate 52 bpm. IL interval 186 ms. QRS duration 92 ms. QT/QTc 428/398 ms. P-R-T axes 43 -14 16.     Imaging:  CT Abdomen Pelvis without Contrast (stone protocol)   Final Result   IMPRESSION:    1.  2 mm obstructing stone distal left ureter resulting in mild left hydronephrosis.      2.  Single tiny 1 mm nonobstructing calyceal tip stone in the left kidney. No other urinary calculi.      3.  Diffuse fatty infiltration of the liver. Again seen are multiple indeterminate rounded nodular areas of relative increased attenuation in the liver. These were present previously and are ultimately indeterminate. As was suggested previously,    recommend follow-up MRI for more definitive evaluation.      4.  Stable postoperative changes Whipple procedure.         Report per radiology    Laboratory:  Labs Ordered and Resulted from Time of ED Arrival to Time of ED Departure   URINE MACROSCOPIC WITH REFLEX TO MICRO - Abnormal       Result Value    Color Urine Yellow      Appearance Urine Slightly Cloudy (*)     Glucose Urine 100 (*)     Bilirubin Urine Negative      Ketones Urine Trace (*)     Specific Gravity Urine 1.031      Blood Urine Large (*)     pH Urine 5.5      Protein Albumin Urine 50 (*)     Urobilinogen Urine Normal      Nitrite Urine Negative      Leukocyte Esterase Urine Negative       RBC Urine >182 (*)     WBC Urine 10 (*)     Squamous Epithelials Urine 1      Budding Yeast Urine Few (*)     Mucus Urine Present (*)     Calcium Oxalate Crystals Urine Moderate (*)    COMPREHENSIVE METABOLIC PANEL - Abnormal    Sodium 138      Potassium 3.5      Chloride 101      Carbon Dioxide (CO2) 24      Anion Gap 13      Urea Nitrogen 14.5      Creatinine 0.92      Calcium 9.4      Glucose 188 (*)     Alkaline Phosphatase 123      AST 28      ALT 34      Protein Total 7.2      Albumin 4.2      Bilirubin Total 0.4      GFR Estimate >90     LIPASE - Normal    Lipase 22     CBC WITH PLATELETS AND DIFFERENTIAL    WBC Count 8.1      RBC Count 4.98      Hemoglobin 14.7      Hematocrit 42.6      MCV 86      MCH 29.5      MCHC 34.5      RDW 12.2      Platelet Count 275      % Neutrophils 64      % Lymphocytes 24      % Monocytes 8      % Eosinophils 2      % Basophils 1      % Immature Granulocytes 1      NRBCs per 100 WBC 0      Absolute Neutrophils 5.3      Absolute Lymphocytes 1.9      Absolute Monocytes 0.7      Absolute Eosinophils 0.1      Absolute Basophils 0.1      Absolute Immature Granulocytes 0.0      Absolute NRBCs 0.0        Emergency Department Course & Assessments:    Interventions:  Medications   ondansetron (ZOFRAN) injection 4 mg (4 mg Intravenous $Given 7/22/23 0113)   HYDROmorphone (PF) (DILAUDID) injection 0.5 mg (0.5 mg Intravenous $Given 7/22/23 0301)   ondansetron (ZOFRAN) injection 4 mg (4 mg Intravenous $Given 7/22/23 0300)   0.9% sodium chloride BOLUS (1,000 mLs Intravenous $New Bag 7/22/23 0112)   ketorolac (TORADOL) injection 10 mg (10 mg Intravenous $Given 7/22/23 0113)      Independent Interpretation (X-rays, CTs, rhythm strip):  none    Assessments/Consultations/Discussion of Management or Tests:  ED Course as of 07/22/23 0423   Sat Jul 22, 2023   0105 I obtained the patient's history and examined as noted above.     Social Determinants of Health affecting care:    None    Disposition:  The patient was discharged to home.     Impression & Plan    CMS Diagnoses: None    Medical Decision Making:    This patient is presenting to the ER complaining of lower abdominal pain and nausea.  Differential is renal colic, pyelonephritis, abdominal aortic aneurysm, or other causes.  CT does confirm ureteral calculi.  The patient's renal function is at baseline/normal.  No signs of infection. Normal WBC. Patient was treated with the above medications and is improved.  He is appropriate for outpatient management is told to follow-up with urology and return for any worsening symptoms or concerns    Diagnosis:    ICD-10-CM    1. Renal colic  N23          Discharge Medications:  New Prescriptions    ONDANSETRON (ZOFRAN ODT) 4 MG ODT TAB    Take 1 tablet (4 mg) by mouth every 8 hours as needed for nausea    OXYCODONE-ACETAMINOPHEN (PERCOCET) 5-325 MG TABLET    Take 1 tablet by mouth every 6 hours as needed for pain    TAMSULOSIN (FLOMAX) 0.4 MG CAPSULE    Take 1 capsule (0.4 mg) by mouth daily for 10 doses      Scribe Disclosure:  Missy POTTER, am serving as a scribe at 1:05 AM on 7/22/2023 to document services personally performed by Edin Samaniego MD based on my observations and the provider's statements to me.     7/22/2023   Edin Samaniego MD Shapin, Ryan P, MD  07/22/23 [id number]

## 2023-07-23 ENCOUNTER — HOSPITAL ENCOUNTER (EMERGENCY)
Facility: CLINIC | Age: 58
Discharge: HOME | End: 2023-07-23
Attending: EMERGENCY MEDICINE | Admitting: EMERGENCY MEDICINE
Payer: COMMERCIAL

## 2023-07-23 VITALS
RESPIRATION RATE: 14 BRPM | HEART RATE: 109 BPM | OXYGEN SATURATION: 94 % | SYSTOLIC BLOOD PRESSURE: 123 MMHG | TEMPERATURE: 98.4 F | DIASTOLIC BLOOD PRESSURE: 91 MMHG

## 2023-07-23 DIAGNOSIS — E86.0 DEHYDRATION: ICD-10-CM

## 2023-07-23 DIAGNOSIS — N20.1 LEFT URETERAL STONE: ICD-10-CM

## 2023-07-23 LAB
ALBUMIN UR-MCNC: 20 MG/DL
ANION GAP SERPL CALCULATED.3IONS-SCNC: 14 MMOL/L (ref 7–15)
APPEARANCE UR: CLEAR
BASOPHILS # BLD AUTO: 0 10E3/UL (ref 0–0.2)
BASOPHILS NFR BLD AUTO: 0 %
BILIRUB UR QL STRIP: NEGATIVE
BUN SERPL-MCNC: 16.4 MG/DL (ref 8–23)
CALCIUM SERPL-MCNC: 9.2 MG/DL (ref 8.6–10)
CHLORIDE SERPL-SCNC: 95 MMOL/L (ref 98–107)
COLOR UR AUTO: YELLOW
CREAT SERPL-MCNC: 1.44 MG/DL (ref 0.67–1.17)
DEPRECATED HCO3 PLAS-SCNC: 26 MMOL/L (ref 22–29)
EOSINOPHIL # BLD AUTO: 0 10E3/UL (ref 0–0.7)
EOSINOPHIL NFR BLD AUTO: 0 %
ERYTHROCYTE [DISTWIDTH] IN BLOOD BY AUTOMATED COUNT: 12 % (ref 10–15)
GFR SERPL CREATININE-BSD FRML MDRD: 56 ML/MIN/1.73M2
GLUCOSE SERPL-MCNC: 176 MG/DL (ref 70–99)
GLUCOSE UR STRIP-MCNC: 70 MG/DL
HCT VFR BLD AUTO: 44.5 % (ref 40–53)
HGB BLD-MCNC: 15.3 G/DL (ref 13.3–17.7)
HGB UR QL STRIP: NEGATIVE
HOLD SPECIMEN: NORMAL
HOLD SPECIMEN: NORMAL
IMM GRANULOCYTES # BLD: 0.1 10E3/UL
IMM GRANULOCYTES NFR BLD: 1 %
KETONES UR STRIP-MCNC: 20 MG/DL
LACTATE SERPL-SCNC: 1.5 MMOL/L (ref 0.7–2)
LEUKOCYTE ESTERASE UR QL STRIP: NEGATIVE
LYMPHOCYTES # BLD AUTO: 1.1 10E3/UL (ref 0.8–5.3)
LYMPHOCYTES NFR BLD AUTO: 7 %
MCH RBC QN AUTO: 29.7 PG (ref 26.5–33)
MCHC RBC AUTO-ENTMCNC: 34.4 G/DL (ref 31.5–36.5)
MCV RBC AUTO: 86 FL (ref 78–100)
MONOCYTES # BLD AUTO: 1.1 10E3/UL (ref 0–1.3)
MONOCYTES NFR BLD AUTO: 6 %
MUCOUS THREADS #/AREA URNS LPF: PRESENT /LPF
NEUTROPHILS # BLD AUTO: 14.6 10E3/UL (ref 1.6–8.3)
NEUTROPHILS NFR BLD AUTO: 86 %
NITRATE UR QL: NEGATIVE
NRBC # BLD AUTO: 0 10E3/UL
NRBC BLD AUTO-RTO: 0 /100
PH UR STRIP: 6 [PH] (ref 5–7)
PLATELET # BLD AUTO: 286 10E3/UL (ref 150–450)
POTASSIUM SERPL-SCNC: 3.6 MMOL/L (ref 3.4–5.3)
RBC # BLD AUTO: 5.15 10E6/UL (ref 4.4–5.9)
RBC URINE: <1 /HPF
SODIUM SERPL-SCNC: 135 MMOL/L (ref 136–145)
SP GR UR STRIP: 1.02 (ref 1–1.03)
UROBILINOGEN UR STRIP-MCNC: NORMAL MG/DL
WBC # BLD AUTO: 17 10E3/UL (ref 4–11)
WBC URINE: 4 /HPF

## 2023-07-23 PROCEDURE — 83605 ASSAY OF LACTIC ACID: CPT | Performed by: EMERGENCY MEDICINE

## 2023-07-23 PROCEDURE — 36415 COLL VENOUS BLD VENIPUNCTURE: CPT | Performed by: EMERGENCY MEDICINE

## 2023-07-23 PROCEDURE — 99284 EMERGENCY DEPT VISIT MOD MDM: CPT | Mod: 25

## 2023-07-23 PROCEDURE — 96374 THER/PROPH/DIAG INJ IV PUSH: CPT | Mod: 59

## 2023-07-23 PROCEDURE — 258N000003 HC RX IP 258 OP 636: Performed by: EMERGENCY MEDICINE

## 2023-07-23 PROCEDURE — 250N000011 HC RX IP 250 OP 636: Performed by: EMERGENCY MEDICINE

## 2023-07-23 PROCEDURE — 96361 HYDRATE IV INFUSION ADD-ON: CPT

## 2023-07-23 PROCEDURE — 85025 COMPLETE CBC W/AUTO DIFF WBC: CPT | Performed by: EMERGENCY MEDICINE

## 2023-07-23 PROCEDURE — 96376 TX/PRO/DX INJ SAME DRUG ADON: CPT

## 2023-07-23 PROCEDURE — 81001 URINALYSIS AUTO W/SCOPE: CPT | Performed by: EMERGENCY MEDICINE

## 2023-07-23 PROCEDURE — 80048 BASIC METABOLIC PNL TOTAL CA: CPT | Performed by: EMERGENCY MEDICINE

## 2023-07-23 RX ORDER — HYDROCODONE BITARTRATE AND ACETAMINOPHEN 5; 325 MG/1; MG/1
1 TABLET ORAL EVERY 4 HOURS PRN
Qty: 10 TABLET | Refills: 0 | Status: SHIPPED | OUTPATIENT
Start: 2023-07-23

## 2023-07-23 RX ORDER — CEPHALEXIN 500 MG/1
500 CAPSULE ORAL 2 TIMES DAILY
Qty: 14 CAPSULE | Refills: 0 | Status: SHIPPED | OUTPATIENT
Start: 2023-07-23 | End: 2023-07-30

## 2023-07-23 RX ORDER — MORPHINE SULFATE 4 MG/ML
4 INJECTION, SOLUTION INTRAMUSCULAR; INTRAVENOUS
Status: DISCONTINUED | OUTPATIENT
Start: 2023-07-23 | End: 2023-07-23 | Stop reason: HOSPADM

## 2023-07-23 RX ADMIN — MORPHINE SULFATE 4 MG: 4 INJECTION, SOLUTION INTRAMUSCULAR; INTRAVENOUS at 19:22

## 2023-07-23 RX ADMIN — MORPHINE SULFATE 4 MG: 4 INJECTION, SOLUTION INTRAMUSCULAR; INTRAVENOUS at 20:13

## 2023-07-23 RX ADMIN — SODIUM CHLORIDE 1000 ML: 9 INJECTION, SOLUTION INTRAVENOUS at 19:23

## 2023-07-23 ASSESSMENT — ACTIVITIES OF DAILY LIVING (ADL): ADLS_ACUITY_SCORE: 35

## 2023-07-23 NOTE — ED PROVIDER NOTES
History     Chief Complaint:  Fever       The history is provided by the patient.      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 The patient was diagnosed with a left sided 2 mm kidney stone on 7/21 and was discharged with Oxycodone, Zofran and Flomax. He states that the medications have not been completley relieving his pain and he began to experience a fever of 100.5 today which prompted him to visit the ED. He denies any changes in urine, abdominal pain, vomiting, nausea, cough, chest pain.   He has taken no pain medication or antifever medication in >6hours and no fever here.    Independent Historian:   None - Patient Only    Review of External Notes:   I reviewed the patients ED note from 7/22 including CT scan    Medications:    Epinephrine   Omeprazole   Zofran   Percocet   Deltasone   Flomax     Past Medical History:    GERD  Kidney stone   Hypertriglyceridemia   Idiopathic acute pancreatitis   Anemia from posthemorrhagic acute  Secondary malignant neoplasm liver   Neuroendocrine tumor of pancreas   Malignant neoplasm of islet of langerhans   Obesity     Past Surgical History:    Hypospadias correction   Whipple procedure    Physical Exam     Patient Vitals for the past 24 hrs:   BP Temp Temp src Pulse Resp SpO2   07/23/23 1838 (!) 152/97 98.4  F (36.9  C) Temporal 104 20 93 %        Physical Exam  Constitutional: Patient is well appearing. No distress.  No spetic ill or in pain appearing.  Head: Atraumatic.  Eyes: Conjunctivae are normal. No scleral icterus.  Neck: Normal range of motion. Neck supple.   Cardiovascular: Normal rate, regular rhythm, normal heart sounds and intact distal perfusion.   Pulmonary/Chest: Breath sounds normal. No respiratory distress.  Abdominal: Soft. Bowel sounds are normal. No distension. No tenderness. No rebound or guarding.    Musculoskeletal: Normal range of motion. No edema or tenderness.   Neurological: Alert and orientated to person, place, and time. No observable focal neuro deficit  Skin: Warm and dry. No rash noted. Not diaphoretic.     Emergency Department Course         Laboratory:  Labs Ordered and Resulted from Time of ED Arrival to Time of ED Departure   BASIC METABOLIC PANEL - Abnormal       Result Value    Sodium 135 (*)     Potassium 3.6      Chloride 95 (*)     Carbon Dioxide (CO2) 26      Anion Gap 14      Urea Nitrogen 16.4      Creatinine 1.44 (*)     Calcium 9.2      Glucose 176 (*)     GFR Estimate 56 (*)    ROUTINE UA WITH MICROSCOPIC REFLEX TO CULTURE - Abnormal    Color Urine Yellow      Appearance Urine Clear      Glucose Urine 70 (*)     Bilirubin Urine Negative      Ketones Urine 20 (*)     Specific Gravity Urine 1.022      Blood Urine Negative      pH Urine 6.0      Protein Albumin Urine 20 (*)     Urobilinogen Urine Normal      Nitrite Urine Negative      Leukocyte Esterase Urine Negative      Mucus Urine Present (*)     RBC Urine <1      WBC Urine 4     CBC WITH PLATELETS AND DIFFERENTIAL - Abnormal    WBC Count 17.0 (*)     RBC Count 5.15      Hemoglobin 15.3      Hematocrit 44.5      MCV 86      MCH 29.7      MCHC 34.4      RDW 12.0      Platelet Count 286      % Neutrophils 86      % Lymphocytes 7      % Monocytes 6      % Eosinophils 0      % Basophils 0      % Immature Granulocytes 1      NRBCs per 100 WBC 0      Absolute Neutrophils 14.6 (*)     Absolute Lymphocytes 1.1      Absolute Monocytes 1.1      Absolute Eosinophils 0.0      Absolute Basophils 0.0      Absolute Immature Granulocytes 0.1      Absolute NRBCs 0.0     LACTIC ACID WHOLE BLOOD - Normal    Lactic Acid 1.5          Procedures       Emergency Department Course & Assessments:       Interventions:  Medications   0.9% sodium chloride BOLUS (1,000 mLs Intravenous $New Bag 7/23/23 1923)   morphine (PF) injection 4 mg (4 mg Intravenous $Given  7/23/23 1922)      Assessments:  1846 I consulted with the patient and obtained history as shown above   I rechecked the patient and explained exam results     Independent Interpretation (X-rays, CTs, rhythm strip):      Consultations/Discussion of Management or Tests:         Social Determinants of Health affecting care:       Disposition:  The patient was discharged to home.     Impression & Plan        Medical Decision Making:  Pt has known ureteral stone and reviewed CT result it is distal small <6mm.  He has been taking medications at home but has expectations to be pain free and discussed and educated on natural course of ureteral stone and pain free may not be possible.  Secondary he took a temperature per him and wife at home elevated but without intervention not elevated here.  Lactate nomral.  Has been poorly hydrating and slight bump oin cr.  IVF given here.  No change in urine with odor pain cloudiness or blood.  Additionally reviewed CT no other new abnl in less than 24 hours.  Has elevated WBC count but no signs of infection on UA or ROS or exam.  Will start cephalexin for home if fever returns since a stone continue meds at home and f/u as already directed.  Return instructions given.      Diagnosis:    ICD-10-CM    1. Left ureteral stone  N20.1            Discharge Medications:  New Prescriptions    No medications on file      Scribe Disclosure:  I, Tavo Amrit, am serving as a scribe at 6:53 PM on 7/23/2023 to document services personally performed by Kyle Crawley MD based on my observations and the provider's statements to me.     7/23/2023   Kyle Crawley MD Stevens, Andrew C, MD  07/23/23 2000

## 2023-07-23 NOTE — ED TRIAGE NOTES
Diagnosed with a 2mm kidney on Friday. Was discharged with oxycodone, flomax, and zofran. Pain not being helped at this time. Spiked a low grade fever at home of 100.5

## 2023-07-24 LAB
ATRIAL RATE - MUSE: 52 BPM
DIASTOLIC BLOOD PRESSURE - MUSE: NORMAL MMHG
INTERPRETATION ECG - MUSE: NORMAL
P AXIS - MUSE: 43 DEGREES
PR INTERVAL - MUSE: 186 MS
QRS DURATION - MUSE: 92 MS
QT - MUSE: 428 MS
QTC - MUSE: 398 MS
R AXIS - MUSE: -14 DEGREES
SYSTOLIC BLOOD PRESSURE - MUSE: NORMAL MMHG
T AXIS - MUSE: 16 DEGREES
VENTRICULAR RATE- MUSE: 52 BPM

## 2023-11-05 ENCOUNTER — HEALTH MAINTENANCE LETTER (OUTPATIENT)
Age: 58
End: 2023-11-05

## 2024-12-22 ENCOUNTER — HEALTH MAINTENANCE LETTER (OUTPATIENT)
Age: 59
End: 2024-12-22

## 2025-07-18 ENCOUNTER — APPOINTMENT (OUTPATIENT)
Dept: CT IMAGING | Facility: CLINIC | Age: 60
End: 2025-07-18
Attending: EMERGENCY MEDICINE
Payer: COMMERCIAL

## 2025-07-18 ENCOUNTER — HOSPITAL ENCOUNTER (EMERGENCY)
Facility: CLINIC | Age: 60
Discharge: HOME | End: 2025-07-18
Attending: EMERGENCY MEDICINE | Admitting: EMERGENCY MEDICINE
Payer: COMMERCIAL

## 2025-07-18 VITALS
BODY MASS INDEX: 32.19 KG/M2 | WEIGHT: 224.87 LBS | SYSTOLIC BLOOD PRESSURE: 127 MMHG | RESPIRATION RATE: 20 BRPM | DIASTOLIC BLOOD PRESSURE: 79 MMHG | HEIGHT: 70 IN | OXYGEN SATURATION: 96 % | TEMPERATURE: 97.9 F | HEART RATE: 77 BPM

## 2025-07-18 DIAGNOSIS — K52.9 INFLAMMATION OF SMALL INTESTINE: ICD-10-CM

## 2025-07-18 DIAGNOSIS — R10.10 UPPER ABDOMINAL PAIN: ICD-10-CM

## 2025-07-18 DIAGNOSIS — K85.90 ACUTE PANCREATITIS, UNSPECIFIED COMPLICATION STATUS, UNSPECIFIED PANCREATITIS TYPE: ICD-10-CM

## 2025-07-18 LAB
ALBUMIN SERPL BCG-MCNC: 4.2 G/DL (ref 3.5–5.2)
ALP SERPL-CCNC: 137 U/L (ref 40–150)
ALT SERPL W P-5'-P-CCNC: 31 U/L (ref 0–70)
ANION GAP SERPL CALCULATED.3IONS-SCNC: 12 MMOL/L (ref 7–15)
AST SERPL W P-5'-P-CCNC: 28 U/L (ref 0–45)
BASOPHILS # BLD AUTO: 0 10E3/UL (ref 0–0.2)
BASOPHILS NFR BLD AUTO: 0 %
BILIRUB DIRECT SERPL-MCNC: 0.15 MG/DL (ref 0–0.3)
BILIRUB SERPL-MCNC: 0.6 MG/DL
BUN SERPL-MCNC: 14.7 MG/DL (ref 8–23)
CALCIUM SERPL-MCNC: 8.9 MG/DL (ref 8.8–10.4)
CHLORIDE SERPL-SCNC: 98 MMOL/L (ref 98–107)
CREAT SERPL-MCNC: 0.71 MG/DL (ref 0.67–1.17)
EGFRCR SERPLBLD CKD-EPI 2021: >90 ML/MIN/1.73M2
EOSINOPHIL # BLD AUTO: 0.1 10E3/UL (ref 0–0.7)
EOSINOPHIL NFR BLD AUTO: 1 %
ERYTHROCYTE [DISTWIDTH] IN BLOOD BY AUTOMATED COUNT: 14.6 % (ref 10–15)
GLUCOSE SERPL-MCNC: 304 MG/DL (ref 70–99)
HCO3 SERPL-SCNC: 23 MMOL/L (ref 22–29)
HCT VFR BLD AUTO: 42.2 % (ref 40–53)
HGB BLD-MCNC: 15 G/DL (ref 13.3–17.7)
HOLD SPECIMEN: NORMAL
HOLD SPECIMEN: NORMAL
IMM GRANULOCYTES # BLD: 0 10E3/UL
IMM GRANULOCYTES NFR BLD: 0 %
LIPASE SERPL-CCNC: 800 U/L (ref 13–60)
LYMPHOCYTES # BLD AUTO: 1 10E3/UL (ref 0.8–5.3)
LYMPHOCYTES NFR BLD AUTO: 11 %
MCH RBC QN AUTO: 32.2 PG (ref 26.5–33)
MCHC RBC AUTO-ENTMCNC: 35.5 G/DL (ref 31.5–36.5)
MCV RBC AUTO: 91 FL (ref 78–100)
MONOCYTES # BLD AUTO: 0.6 10E3/UL (ref 0–1.3)
MONOCYTES NFR BLD AUTO: 7 %
NEUTROPHILS # BLD AUTO: 7.2 10E3/UL (ref 1.6–8.3)
NEUTROPHILS NFR BLD AUTO: 81 %
NRBC # BLD AUTO: 0 10E3/UL
NRBC BLD AUTO-RTO: 0 /100
PLATELET # BLD AUTO: 174 10E3/UL (ref 150–450)
POTASSIUM SERPL-SCNC: 4.2 MMOL/L (ref 3.4–5.3)
PROT SERPL-MCNC: 6.7 G/DL (ref 6.4–8.3)
RBC # BLD AUTO: 4.66 10E6/UL (ref 4.4–5.9)
SODIUM SERPL-SCNC: 133 MMOL/L (ref 135–145)
WBC # BLD AUTO: 8.9 10E3/UL (ref 4–11)

## 2025-07-18 PROCEDURE — 36415 COLL VENOUS BLD VENIPUNCTURE: CPT | Performed by: EMERGENCY MEDICINE

## 2025-07-18 PROCEDURE — 80053 COMPREHEN METABOLIC PANEL: CPT | Performed by: EMERGENCY MEDICINE

## 2025-07-18 PROCEDURE — 96361 HYDRATE IV INFUSION ADD-ON: CPT

## 2025-07-18 PROCEDURE — 250N000011 HC RX IP 250 OP 636: Performed by: EMERGENCY MEDICINE

## 2025-07-18 PROCEDURE — 250N000013 HC RX MED GY IP 250 OP 250 PS 637: Performed by: EMERGENCY MEDICINE

## 2025-07-18 PROCEDURE — 250N000009 HC RX 250: Performed by: EMERGENCY MEDICINE

## 2025-07-18 PROCEDURE — 85004 AUTOMATED DIFF WBC COUNT: CPT | Performed by: EMERGENCY MEDICINE

## 2025-07-18 PROCEDURE — 99285 EMERGENCY DEPT VISIT HI MDM: CPT | Mod: 25 | Performed by: EMERGENCY MEDICINE

## 2025-07-18 PROCEDURE — 82248 BILIRUBIN DIRECT: CPT | Performed by: EMERGENCY MEDICINE

## 2025-07-18 PROCEDURE — 258N000003 HC RX IP 258 OP 636: Performed by: EMERGENCY MEDICINE

## 2025-07-18 PROCEDURE — 74177 CT ABD & PELVIS W/CONTRAST: CPT

## 2025-07-18 PROCEDURE — 83690 ASSAY OF LIPASE: CPT | Performed by: EMERGENCY MEDICINE

## 2025-07-18 PROCEDURE — 96374 THER/PROPH/DIAG INJ IV PUSH: CPT | Mod: 59

## 2025-07-18 RX ORDER — MAGNESIUM HYDROXIDE/ALUMINUM HYDROXICE/SIMETHICONE 120; 1200; 1200 MG/30ML; MG/30ML; MG/30ML
30 SUSPENSION ORAL ONCE
Status: COMPLETED | OUTPATIENT
Start: 2025-07-18 | End: 2025-07-18

## 2025-07-18 RX ORDER — HYDROMORPHONE HYDROCHLORIDE 1 MG/ML
0.5 INJECTION, SOLUTION INTRAMUSCULAR; INTRAVENOUS; SUBCUTANEOUS EVERY 30 MIN PRN
Refills: 0 | Status: DISCONTINUED | OUTPATIENT
Start: 2025-07-18 | End: 2025-07-18 | Stop reason: HOSPADM

## 2025-07-18 RX ORDER — ONDANSETRON 4 MG/1
4 TABLET, ORALLY DISINTEGRATING ORAL EVERY 8 HOURS PRN
Qty: 10 TABLET | Refills: 0 | Status: SHIPPED | OUTPATIENT
Start: 2025-07-18 | End: 2025-07-21

## 2025-07-18 RX ORDER — FAMOTIDINE 20 MG/1
20 TABLET, FILM COATED ORAL ONCE
Status: COMPLETED | OUTPATIENT
Start: 2025-07-18 | End: 2025-07-18

## 2025-07-18 RX ORDER — IOPAMIDOL 755 MG/ML
100 INJECTION, SOLUTION INTRAVASCULAR ONCE
Status: COMPLETED | OUTPATIENT
Start: 2025-07-18 | End: 2025-07-18

## 2025-07-18 RX ORDER — OXYCODONE HYDROCHLORIDE 5 MG/1
5-10 TABLET ORAL EVERY 6 HOURS PRN
Qty: 12 TABLET | Refills: 0 | Status: SHIPPED | OUTPATIENT
Start: 2025-07-18 | End: 2025-07-21

## 2025-07-18 RX ADMIN — IOPAMIDOL 100 ML: 755 INJECTION, SOLUTION INTRAVENOUS at 07:51

## 2025-07-18 RX ADMIN — ALUMINUM HYDROXIDE, MAGNESIUM HYDROXIDE, AND DIMETHICONE 30 ML: 200; 20; 200 SUSPENSION ORAL at 08:05

## 2025-07-18 RX ADMIN — HYDROMORPHONE HYDROCHLORIDE 0.5 MG: 1 INJECTION, SOLUTION INTRAMUSCULAR; INTRAVENOUS; SUBCUTANEOUS at 07:22

## 2025-07-18 RX ADMIN — SODIUM CHLORIDE 1000 ML: 9 INJECTION, SOLUTION INTRAVENOUS at 08:09

## 2025-07-18 RX ADMIN — FAMOTIDINE 20 MG: 20 TABLET, FILM COATED ORAL at 08:05

## 2025-07-18 RX ADMIN — SODIUM CHLORIDE 65 ML: 9 INJECTION, SOLUTION INTRAVENOUS at 07:52

## 2025-07-18 ASSESSMENT — COLUMBIA-SUICIDE SEVERITY RATING SCALE - C-SSRS
6. HAVE YOU EVER DONE ANYTHING, STARTED TO DO ANYTHING, OR PREPARED TO DO ANYTHING TO END YOUR LIFE?: NO
2. HAVE YOU ACTUALLY HAD ANY THOUGHTS OF KILLING YOURSELF IN THE PAST MONTH?: NO
1. IN THE PAST MONTH, HAVE YOU WISHED YOU WERE DEAD OR WISHED YOU COULD GO TO SLEEP AND NOT WAKE UP?: NO

## 2025-07-18 ASSESSMENT — ACTIVITIES OF DAILY LIVING (ADL)
ADLS_ACUITY_SCORE: 42

## 2025-07-18 NOTE — ED PROVIDER NOTES
Emergency Department Note      History of Present Illness     Chief Complaint   Abdominal Pain      HPI   Jeremy Cloud is a 60 year old male with a past medical history significant for GERD, hypertriglyceridemia, pancreatitis, liver cancer, and pancreatic cancer who presents for evaluation of upper abdominal pain. The patient reports that between 2200 and 2300 last night, he developed constant upper abdominal pain. He was unable to sleep last night. He reports that he might have eaten something strange last night that could be causing these symptoms. He reports that around 0600, he became nauseous. He has not vomited. He reports that his last bowel movement was around 0600 and it was normal. He denies having any constipation, diarrhea, bloody stools, or black and tarry stools. He has not had any changes to his urination. He denies any chest pain or shortness of breath. He reports that he took Pepto Bismol. The patient has a history of pancreatic cancer and has had a whipple, cholecystectomy, and part of his stomach is resected. He is currently undergoing chemotherapy to manage cancer on his liver. He had an MRI at the end of June that showed the spots on his liver but no other significant findings. He denies any history of ulcers or gastritis.     Independent Historian   None    Review of External Notes   I reviewed past surgical note from 2021.     Past Medical History     Medical History and Problem List   GERD  Hypertriglyceridemia  Pancreatitis  Secondary malignant neoplasm of liver  Gastroparesis  Hypophosphatemia  Obesity  Malignant neoplasm of pancreas islet cell  Mass pancreas    Medications   Omeprazole  Xeloda  Metformin    Surgical History   Hypospadias correction  Penis surgery  Pancreatectomy whipple  Block - transversus abdominis plane    Physical Exam     Patient Vitals for the past 24 hrs:   BP Temp Temp src Pulse Resp SpO2 Height Weight   07/18/25 1125 127/79 -- -- 77 -- 96 % -- --   07/18/25  "0915 118/74 -- -- 82 -- 96 % -- --   07/18/25 0905 114/74 -- -- -- -- 95 % -- --   07/18/25 0855 -- -- -- -- -- 96 % -- --   07/18/25 0845 105/73 -- -- 70 -- 94 % -- --   07/18/25 0835 115/79 -- -- -- -- 94 % -- --   07/18/25 0825 -- -- -- -- -- 95 % -- --   07/18/25 0815 117/77 -- -- 87 -- 93 % -- --   07/18/25 0613 132/79 97.9  F (36.6  C) Oral 65 20 94 % 1.778 m (5' 10\") 102 kg (224 lb 13.9 oz)     Physical Exam  General: Adult laying on the stretcher  Eyes: PERRL, Conjunctive within normal limits. No scleral icterus.  ENT: Moist mucous membranes, oropharynx clear.   CV: Normal S1S2, no murmur, rub or gallop. Regular rate and rhythm  Resp: Clear to auscultation bilaterally, no wheezes, rales or rhonchi. Normal respiratory effort.  GI: Abdomen is soft and nondistended. Epigastric tenderness with voluntary guarding, no rebound.  No palpable masses.   MSK: No edema. Nontender. Normal active range of motion.  Skin: Warm and dry. No rashes or lesions or ecchymoses on visible skin.  Neuro: Alert and oriented. Responds appropriately to all questions and commands. No focal findings appreciated. Normal muscle tone.  Psych: Normal mood and affect. Pleasant.     Diagnostics     Lab Results   Labs Ordered and Resulted from Time of ED Arrival to Time of ED Departure   COMPREHENSIVE METABOLIC PANEL - Abnormal       Result Value    Sodium 133 (*)     Potassium 4.2      Carbon Dioxide (CO2) 23      Anion Gap 12      Urea Nitrogen 14.7      Creatinine 0.71      GFR Estimate >90      Calcium 8.9      Chloride 98      Glucose 304 (*)     Alkaline Phosphatase 137      AST 28      ALT 31      Protein Total 6.7      Albumin 4.2      Bilirubin Total 0.6     LIPASE - Abnormal    Lipase 800 (*)    BILIRUBIN DIRECT - Normal    Bilirubin Direct 0.15     CBC WITH PLATELETS AND DIFFERENTIAL    WBC Count 8.9      RBC Count 4.66      Hemoglobin 15.0      Hematocrit 42.2      MCV 91      MCH 32.2      MCHC 35.5      RDW 14.6      Platelet Count " 174      % Neutrophils 81      % Lymphocytes 11      % Monocytes 7      % Eosinophils 1      % Basophils 0      % Immature Granulocytes 0      NRBCs per 100 WBC 0      Absolute Neutrophils 7.2      Absolute Lymphocytes 1.0      Absolute Monocytes 0.6      Absolute Eosinophils 0.1      Absolute Basophils 0.0      Absolute Immature Granulocytes 0.0      Absolute NRBCs 0.0         Imaging   CT Abdomen Pelvis w Contrast   Final Result   IMPRESSION:    1.  Whipple surgery. Acutely inflamed jejunal limb along the pancreaticojejunostomy site is identified. This segment of bowel shows hypodense wall thickening with surrounding prominent edema. There is no abscess or free air. This may relate to a    localized enteritis from an infectious, inflammatory, or ischemic etiology.   2.  Multiple hepatic metastasis again noted. Several lesions are stable, other small lesions are difficult to visualize at comparison imaging. There is a larger metastasis at segment 8 of the liver.   3.  Fatty liver.   4.  No other acute abnormality.   5.  A few stable indeterminant abdominal lymph nodes at the Whipple surgery region.        Independent Interpretation   None    ED Course      Medications Administered   Medications   alum & mag hydroxide-simethicone (MAALOX) suspension 30 mL (30 mLs Oral $Given 7/18/25 0805)   famotidine (PEPCID) tablet 20 mg (20 mg Oral $Given 7/18/25 0805)   sodium chloride 0.9% BOLUS 1,000 mL (0 mLs Intravenous Stopped 7/18/25 1125)   iopamidol (ISOVUE-370) solution 100 mL (100 mLs Intravenous $Given 7/18/25 0751)   sodium chloride 0.9 % bag for CT scan flush (65 mLs Intravenous $Given 7/18/25 0752)       Procedures   Procedures     Discussion of Management   [facility] Surgery at [facility], Dr. Menard     ED Course   ED Course as of 07/18/25 1110   Fri Jul 18, 2025   0736 I obtained history and examined the patient as noted above.    0848 I rechecked and updated the patient.    0917 I discussed the patient's presentation  "with Dr. Menard from [facility]. He is not sure what the issue is and does not believe his team can help.    0921 I rechecked and updated the patient.    1046 I rechecked and updated the patient. He would prefer to go home.    1109 I reassessed the patient. He has tolerated oral challenge and denies any pain currently aside from \"when I push on it\".We discussed plan for discharge and the patient is comfortable with this.        Additional Documentation  None    Medical Decision Making / Diagnosis     CMS Diagnoses: None    MIPS   None               MDM   Jeremy Cloud is a 60 year old male with a history of whipple procedure secondary to liver/pancreatic cancer and pancreatitis who presents with epigastric abdominal pain.  The etiologies are considered including increasing cancer, pancreatitis, peptic ulcer disease, gastritis.  The patient does not have a gallbladder, unlikely pain related to sequelae of cholecystectomy.  He does not appear ill, no fever.  He has significant pain and this was relieved with interventions here.  Imaging was obtained due to his history and demonstrates nonspecific inflammation of the jejunal limb along the pancreaticojejunostomy site.  There are some wall thickening of the bowel as well.  It is not clear if this is related to pancreatitis, elevated lipase noted, or alternative process causing mild pancreatic inflammation secondarily.  Either way, the patient did not want to stay in the hospital, noting that he felt his pain was controlled and he would come back if things worsen.  I spoke with the surgeon who is part of the team that performed the remote Whipple, they did not have any suggestions or concerns emergently for acute surgical needs.  He is recommended liquid diet, slowly transitioning to full diet as he tolerates.  Antiemetics as needed.  Pain medications as needed.  Follow-up recommended within 2 to 3 days with his primary care provider.  All questions were answered prior to " discharge.    Disposition   The patient was discharged.     Diagnosis     ICD-10-CM    1. Upper abdominal pain  R10.10 ED To Primary Care Referral      2. Acute pancreatitis, unspecified complication status, unspecified pancreatitis type  K85.90 ED To Primary Care Referral      3. Inflammation of small intestine  K52.9 ED To Primary Care Referral           Discharge Medications   Discharge Medication List as of 7/18/2025 11:26 AM        START taking these medications    Details   ondansetron (ZOFRAN ODT) 4 MG ODT tab Take 1 tablet (4 mg) by mouth every 8 hours as needed for nausea or vomiting., Disp-10 tablet, R-0, E-Prescribe      oxyCODONE (ROXICODONE) 5 MG tablet Take 1-2 tablets (5-10 mg) by mouth every 6 hours as needed for severe pain., Disp-12 tablet, R-0, E-Prescribe               Scribe Disclosure:  Angela POTTER, am serving as a scribe at 7:23 AM on 7/18/2025 to document services personally performed by Cindy Bonilla MD based on my observations and the provider's statements to me.        Cindy Bonilla MD  07/19/25 2479

## 2025-07-18 NOTE — DISCHARGE INSTRUCTIONS
You have nonspecific inflammation of your jejunum, which is a part of your small intestine.  This could be a viral infection, medication related, related to an inflamed pancreas, or unclear.  No indication for trickle intervention at this time.  Your pancreas also shows signs of possible inflammation.  Follow instructions on discharge including liquid diet with slow advancement to regular diet and return immediately with worsening of symptoms.

## 2025-07-18 NOTE — ED TRIAGE NOTES
"Upper abdominal constant throbbing pain since 2200.  Currently being treated for cancer \"with spots on my liver.\"  Was seen at [place] yesterday and ate food truck food.  Was supposed to start oral chemo today.      Triage Assessment (Adult)       Row Name 07/18/25 0615          Triage Assessment    Airway WDL WDL        Respiratory WDL    Respiratory WDL WDL        Cardiac WDL    Cardiac WDL WDL                     "

## 2025-08-13 ENCOUNTER — APPOINTMENT (OUTPATIENT)
Dept: CT IMAGING | Facility: CLINIC | Age: 60
End: 2025-08-13
Attending: STUDENT IN AN ORGANIZED HEALTH CARE EDUCATION/TRAINING PROGRAM
Payer: COMMERCIAL

## 2025-08-13 ENCOUNTER — HOSPITAL ENCOUNTER (INPATIENT)
Facility: CLINIC | Age: 60
End: 2025-08-13
Attending: STUDENT IN AN ORGANIZED HEALTH CARE EDUCATION/TRAINING PROGRAM | Admitting: HOSPITALIST
Payer: COMMERCIAL

## 2025-08-13 ENCOUNTER — APPOINTMENT (OUTPATIENT)
Dept: MRI IMAGING | Facility: CLINIC | Age: 60
End: 2025-08-13
Attending: PHYSICIAN ASSISTANT
Payer: COMMERCIAL

## 2025-08-13 DIAGNOSIS — K85.90 ACUTE PANCREATITIS WITHOUT INFECTION OR NECROSIS, UNSPECIFIED PANCREATITIS TYPE: Primary | ICD-10-CM

## 2025-08-13 DIAGNOSIS — B37.2 CANDIDIASIS, INTERTRIGO: ICD-10-CM

## 2025-08-13 LAB
ALBUMIN SERPL BCG-MCNC: 4.4 G/DL (ref 3.5–5.2)
ALP SERPL-CCNC: 148 U/L (ref 40–150)
ALT SERPL W P-5'-P-CCNC: 26 U/L (ref 0–70)
ANION GAP SERPL CALCULATED.3IONS-SCNC: 13 MMOL/L (ref 7–15)
AST SERPL W P-5'-P-CCNC: 21 U/L (ref 0–45)
ATRIAL RATE - MUSE: 72 BPM
BASOPHILS # BLD AUTO: 0.02 10E3/UL (ref 0–0.2)
BASOPHILS NFR BLD AUTO: 0.3 %
BILIRUB DIRECT SERPL-MCNC: 0.21 MG/DL (ref 0–0.3)
BILIRUB SERPL-MCNC: 0.7 MG/DL
BUN SERPL-MCNC: 9.9 MG/DL (ref 8–23)
CALCIUM SERPL-MCNC: 9 MG/DL (ref 8.8–10.4)
CHLORIDE SERPL-SCNC: 97 MMOL/L (ref 98–107)
CREAT SERPL-MCNC: 0.67 MG/DL (ref 0.67–1.17)
DIASTOLIC BLOOD PRESSURE - MUSE: NORMAL MMHG
EGFRCR SERPLBLD CKD-EPI 2021: >90 ML/MIN/1.73M2
EOSINOPHIL # BLD AUTO: 0.05 10E3/UL (ref 0–0.7)
EOSINOPHIL NFR BLD AUTO: 0.8 %
ERYTHROCYTE [DISTWIDTH] IN BLOOD BY AUTOMATED COUNT: 15 % (ref 10–15)
GLUCOSE BLDC GLUCOMTR-MCNC: 239 MG/DL (ref 70–99)
GLUCOSE BLDC GLUCOMTR-MCNC: 254 MG/DL (ref 70–99)
GLUCOSE BLDC GLUCOMTR-MCNC: 263 MG/DL (ref 70–99)
GLUCOSE SERPL-MCNC: 299 MG/DL (ref 70–99)
HCO3 SERPL-SCNC: 24 MMOL/L (ref 22–29)
HCT VFR BLD AUTO: 43.8 % (ref 40–53)
HGB BLD-MCNC: 15.5 G/DL (ref 13.3–17.7)
HOLD SPECIMEN: NORMAL
HOLD SPECIMEN: NORMAL
IMM GRANULOCYTES # BLD: 0.03 10E3/UL
IMM GRANULOCYTES NFR BLD: 0.5 %
INTERPRETATION ECG - MUSE: NORMAL
LIPASE SERPL-CCNC: 401 U/L (ref 13–60)
LYMPHOCYTES # BLD AUTO: 0.62 10E3/UL (ref 0.8–5.3)
LYMPHOCYTES NFR BLD AUTO: 10.5 %
MCH RBC QN AUTO: 31.8 PG (ref 26.5–33)
MCHC RBC AUTO-ENTMCNC: 35.4 G/DL (ref 31.5–36.5)
MCV RBC AUTO: 89.8 FL (ref 78–100)
MONOCYTES # BLD AUTO: 0.37 10E3/UL (ref 0–1.3)
MONOCYTES NFR BLD AUTO: 6.3 %
NEUTROPHILS # BLD AUTO: 4.82 10E3/UL (ref 1.6–8.3)
NEUTROPHILS NFR BLD AUTO: 81.6 %
NRBC # BLD AUTO: 0 10E3/UL
NRBC BLD AUTO-RTO: 0 /100
P AXIS - MUSE: 57 DEGREES
PLATELET # BLD AUTO: 204 10E3/UL (ref 150–450)
POTASSIUM SERPL-SCNC: 4.1 MMOL/L (ref 3.4–5.3)
PR INTERVAL - MUSE: 170 MS
PROT SERPL-MCNC: 7.1 G/DL (ref 6.4–8.3)
QRS DURATION - MUSE: 94 MS
QT - MUSE: 374 MS
QTC - MUSE: 409 MS
R AXIS - MUSE: -20 DEGREES
RBC # BLD AUTO: 4.88 10E6/UL (ref 4.4–5.9)
SODIUM SERPL-SCNC: 134 MMOL/L (ref 135–145)
SYSTOLIC BLOOD PRESSURE - MUSE: NORMAL MMHG
T AXIS - MUSE: 11 DEGREES
TROPONIN T SERPL HS-MCNC: <6 NG/L
VENTRICULAR RATE- MUSE: 72 BPM
WBC # BLD AUTO: 5.91 10E3/UL (ref 4–11)

## 2025-08-13 PROCEDURE — 83690 ASSAY OF LIPASE: CPT | Performed by: STUDENT IN AN ORGANIZED HEALTH CARE EDUCATION/TRAINING PROGRAM

## 2025-08-13 PROCEDURE — 93005 ELECTROCARDIOGRAM TRACING: CPT

## 2025-08-13 PROCEDURE — 84484 ASSAY OF TROPONIN QUANT: CPT | Performed by: STUDENT IN AN ORGANIZED HEALTH CARE EDUCATION/TRAINING PROGRAM

## 2025-08-13 PROCEDURE — 99223 1ST HOSP IP/OBS HIGH 75: CPT | Performed by: PHYSICIAN ASSISTANT

## 2025-08-13 PROCEDURE — 85004 AUTOMATED DIFF WBC COUNT: CPT | Performed by: STUDENT IN AN ORGANIZED HEALTH CARE EDUCATION/TRAINING PROGRAM

## 2025-08-13 PROCEDURE — 250N000013 HC RX MED GY IP 250 OP 250 PS 637: Performed by: PHYSICIAN ASSISTANT

## 2025-08-13 PROCEDURE — 258N000003 HC RX IP 258 OP 636: Performed by: PHYSICIAN ASSISTANT

## 2025-08-13 PROCEDURE — 36415 COLL VENOUS BLD VENIPUNCTURE: CPT | Performed by: STUDENT IN AN ORGANIZED HEALTH CARE EDUCATION/TRAINING PROGRAM

## 2025-08-13 PROCEDURE — 96374 THER/PROPH/DIAG INJ IV PUSH: CPT | Mod: 59

## 2025-08-13 PROCEDURE — 74183 MRI ABD W/O CNTR FLWD CNTR: CPT

## 2025-08-13 PROCEDURE — 250N000009 HC RX 250: Performed by: STUDENT IN AN ORGANIZED HEALTH CARE EDUCATION/TRAINING PROGRAM

## 2025-08-13 PROCEDURE — 120N000001 HC R&B MED SURG/OB

## 2025-08-13 PROCEDURE — 250N000011 HC RX IP 250 OP 636: Performed by: STUDENT IN AN ORGANIZED HEALTH CARE EDUCATION/TRAINING PROGRAM

## 2025-08-13 PROCEDURE — A9585 GADOBUTROL INJECTION: HCPCS | Performed by: STUDENT IN AN ORGANIZED HEALTH CARE EDUCATION/TRAINING PROGRAM

## 2025-08-13 PROCEDURE — 255N000002 HC RX 255 OP 636: Performed by: STUDENT IN AN ORGANIZED HEALTH CARE EDUCATION/TRAINING PROGRAM

## 2025-08-13 PROCEDURE — 96361 HYDRATE IV INFUSION ADD-ON: CPT

## 2025-08-13 PROCEDURE — 99285 EMERGENCY DEPT VISIT HI MDM: CPT | Mod: 25 | Performed by: STUDENT IN AN ORGANIZED HEALTH CARE EDUCATION/TRAINING PROGRAM

## 2025-08-13 PROCEDURE — 74177 CT ABD & PELVIS W/CONTRAST: CPT

## 2025-08-13 PROCEDURE — 96375 TX/PRO/DX INJ NEW DRUG ADDON: CPT

## 2025-08-13 PROCEDURE — 82962 GLUCOSE BLOOD TEST: CPT

## 2025-08-13 PROCEDURE — 250N000012 HC RX MED GY IP 250 OP 636 PS 637: Performed by: PHYSICIAN ASSISTANT

## 2025-08-13 PROCEDURE — 82248 BILIRUBIN DIRECT: CPT | Performed by: STUDENT IN AN ORGANIZED HEALTH CARE EDUCATION/TRAINING PROGRAM

## 2025-08-13 PROCEDURE — 250N000011 HC RX IP 250 OP 636: Performed by: PHYSICIAN ASSISTANT

## 2025-08-13 PROCEDURE — 258N000003 HC RX IP 258 OP 636: Performed by: STUDENT IN AN ORGANIZED HEALTH CARE EDUCATION/TRAINING PROGRAM

## 2025-08-13 PROCEDURE — 96376 TX/PRO/DX INJ SAME DRUG ADON: CPT

## 2025-08-13 RX ORDER — NALOXONE HYDROCHLORIDE 0.4 MG/ML
0.2 INJECTION, SOLUTION INTRAMUSCULAR; INTRAVENOUS; SUBCUTANEOUS
Status: DISCONTINUED | OUTPATIENT
Start: 2025-08-13 | End: 2025-08-15 | Stop reason: HOSPADM

## 2025-08-13 RX ORDER — IOPAMIDOL 755 MG/ML
500 INJECTION, SOLUTION INTRAVASCULAR ONCE
Status: COMPLETED | OUTPATIENT
Start: 2025-08-13 | End: 2025-08-13

## 2025-08-13 RX ORDER — ACETAMINOPHEN 500 MG
500-1000 TABLET ORAL EVERY 6 HOURS PRN
COMMUNITY

## 2025-08-13 RX ORDER — NICOTINE POLACRILEX 4 MG
15-30 LOZENGE BUCCAL
Status: DISCONTINUED | OUTPATIENT
Start: 2025-08-13 | End: 2025-08-15 | Stop reason: HOSPADM

## 2025-08-13 RX ORDER — ONDANSETRON 4 MG/1
4 TABLET, ORALLY DISINTEGRATING ORAL EVERY 8 HOURS PRN
COMMUNITY

## 2025-08-13 RX ORDER — OXYCODONE HYDROCHLORIDE 5 MG/1
5 TABLET ORAL EVERY 6 HOURS PRN
COMMUNITY

## 2025-08-13 RX ORDER — NALOXONE HYDROCHLORIDE 0.4 MG/ML
0.4 INJECTION, SOLUTION INTRAMUSCULAR; INTRAVENOUS; SUBCUTANEOUS
Status: DISCONTINUED | OUTPATIENT
Start: 2025-08-13 | End: 2025-08-15 | Stop reason: HOSPADM

## 2025-08-13 RX ORDER — CAPECITABINE 500 MG/1
1500 TABLET, FILM COATED ORAL 2 TIMES DAILY
COMMUNITY

## 2025-08-13 RX ORDER — CALCIUM CARBONATE 500 MG/1
1000 TABLET, CHEWABLE ORAL 4 TIMES DAILY PRN
Status: DISCONTINUED | OUTPATIENT
Start: 2025-08-13 | End: 2025-08-15 | Stop reason: HOSPADM

## 2025-08-13 RX ORDER — PROCHLORPERAZINE MALEATE 5 MG/1
10 TABLET ORAL EVERY 6 HOURS PRN
Status: DISCONTINUED | OUTPATIENT
Start: 2025-08-13 | End: 2025-08-15 | Stop reason: HOSPADM

## 2025-08-13 RX ORDER — HYDROMORPHONE HCL IN WATER/PF 6 MG/30 ML
0.2 PATIENT CONTROLLED ANALGESIA SYRINGE INTRAVENOUS
Status: DISCONTINUED | OUTPATIENT
Start: 2025-08-13 | End: 2025-08-15 | Stop reason: HOSPADM

## 2025-08-13 RX ORDER — SODIUM CHLORIDE, SODIUM LACTATE, POTASSIUM CHLORIDE, CALCIUM CHLORIDE 600; 310; 30; 20 MG/100ML; MG/100ML; MG/100ML; MG/100ML
INJECTION, SOLUTION INTRAVENOUS CONTINUOUS
Status: ACTIVE | OUTPATIENT
Start: 2025-08-13 | End: 2025-08-14

## 2025-08-13 RX ORDER — PROCHLORPERAZINE MALEATE 10 MG
10 TABLET ORAL EVERY 6 HOURS PRN
COMMUNITY

## 2025-08-13 RX ORDER — HYDROCORTISONE 10 MG/ML
LOTION TOPICAL 2 TIMES DAILY PRN
COMMUNITY

## 2025-08-13 RX ORDER — ONDANSETRON 4 MG/1
4 TABLET, ORALLY DISINTEGRATING ORAL EVERY 6 HOURS PRN
Status: DISCONTINUED | OUTPATIENT
Start: 2025-08-13 | End: 2025-08-15 | Stop reason: HOSPADM

## 2025-08-13 RX ORDER — TRIAMCINOLONE ACETONIDE 5 MG/G
OINTMENT TOPICAL 2 TIMES DAILY PRN
COMMUNITY

## 2025-08-13 RX ORDER — SALIVA STIMULANT COMB. NO.3
1 SPRAY, NON-AEROSOL (ML) MUCOUS MEMBRANE 4 TIMES DAILY PRN
Status: DISCONTINUED | OUTPATIENT
Start: 2025-08-13 | End: 2025-08-15 | Stop reason: HOSPADM

## 2025-08-13 RX ORDER — GADOBUTROL 604.72 MG/ML
10 INJECTION INTRAVENOUS ONCE
Status: COMPLETED | OUTPATIENT
Start: 2025-08-13 | End: 2025-08-13

## 2025-08-13 RX ORDER — METFORMIN HYDROCHLORIDE 500 MG/1
1000 TABLET, EXTENDED RELEASE ORAL
COMMUNITY

## 2025-08-13 RX ORDER — HYDROMORPHONE HYDROCHLORIDE 1 MG/ML
0.5 INJECTION, SOLUTION INTRAMUSCULAR; INTRAVENOUS; SUBCUTANEOUS ONCE
Refills: 0 | Status: COMPLETED | OUTPATIENT
Start: 2025-08-13 | End: 2025-08-13

## 2025-08-13 RX ORDER — SODIUM CHLORIDE 9 MG/ML
INJECTION, SOLUTION INTRAVENOUS ONCE
Status: COMPLETED | OUTPATIENT
Start: 2025-08-13 | End: 2025-08-13

## 2025-08-13 RX ORDER — LIDOCAINE 40 MG/G
CREAM TOPICAL
Status: DISCONTINUED | OUTPATIENT
Start: 2025-08-13 | End: 2025-08-15 | Stop reason: HOSPADM

## 2025-08-13 RX ORDER — ONDANSETRON 2 MG/ML
4 INJECTION INTRAMUSCULAR; INTRAVENOUS ONCE
Status: COMPLETED | OUTPATIENT
Start: 2025-08-13 | End: 2025-08-13

## 2025-08-13 RX ORDER — HYDROMORPHONE HCL IN WATER/PF 6 MG/30 ML
0.4 PATIENT CONTROLLED ANALGESIA SYRINGE INTRAVENOUS
Status: DISCONTINUED | OUTPATIENT
Start: 2025-08-13 | End: 2025-08-15 | Stop reason: HOSPADM

## 2025-08-13 RX ORDER — HYDRALAZINE HYDROCHLORIDE 10 MG/1
10 TABLET, FILM COATED ORAL EVERY 4 HOURS PRN
Status: DISCONTINUED | OUTPATIENT
Start: 2025-08-13 | End: 2025-08-15 | Stop reason: HOSPADM

## 2025-08-13 RX ORDER — ONDANSETRON 2 MG/ML
4 INJECTION INTRAMUSCULAR; INTRAVENOUS EVERY 6 HOURS PRN
Status: DISCONTINUED | OUTPATIENT
Start: 2025-08-13 | End: 2025-08-15 | Stop reason: HOSPADM

## 2025-08-13 RX ORDER — GADOBUTROL 604.72 MG/ML
10 INJECTION INTRAVENOUS ONCE
Status: DISCONTINUED | OUTPATIENT
Start: 2025-08-13 | End: 2025-08-15 | Stop reason: HOSPADM

## 2025-08-13 RX ORDER — DEXTROSE MONOHYDRATE 25 G/50ML
25-50 INJECTION, SOLUTION INTRAVENOUS
Status: DISCONTINUED | OUTPATIENT
Start: 2025-08-13 | End: 2025-08-15 | Stop reason: HOSPADM

## 2025-08-13 RX ORDER — HYDRALAZINE HYDROCHLORIDE 20 MG/ML
10 INJECTION INTRAMUSCULAR; INTRAVENOUS EVERY 4 HOURS PRN
Status: DISCONTINUED | OUTPATIENT
Start: 2025-08-13 | End: 2025-08-15 | Stop reason: HOSPADM

## 2025-08-13 RX ORDER — FLUOCINONIDE TOPICAL SOLUTION USP, 0.05% 0.5 MG/ML
SOLUTION TOPICAL 2 TIMES DAILY PRN
COMMUNITY

## 2025-08-13 RX ORDER — HYDROMORPHONE HYDROCHLORIDE 1 MG/ML
0.5 INJECTION, SOLUTION INTRAMUSCULAR; INTRAVENOUS; SUBCUTANEOUS ONCE
Status: COMPLETED | OUTPATIENT
Start: 2025-08-13 | End: 2025-08-13

## 2025-08-13 RX ORDER — OXYCODONE HYDROCHLORIDE 5 MG/1
5 TABLET ORAL EVERY 4 HOURS PRN
Status: DISCONTINUED | OUTPATIENT
Start: 2025-08-13 | End: 2025-08-15 | Stop reason: HOSPADM

## 2025-08-13 RX ADMIN — INSULIN ASPART 3 UNITS: 100 INJECTION, SOLUTION INTRAVENOUS; SUBCUTANEOUS at 19:00

## 2025-08-13 RX ADMIN — SODIUM CHLORIDE, SODIUM LACTATE, POTASSIUM CHLORIDE, AND CALCIUM CHLORIDE: .6; .31; .03; .02 INJECTION, SOLUTION INTRAVENOUS at 20:10

## 2025-08-13 RX ADMIN — HYDROMORPHONE HYDROCHLORIDE 0.5 MG: 1 INJECTION, SOLUTION INTRAMUSCULAR; INTRAVENOUS; SUBCUTANEOUS at 10:03

## 2025-08-13 RX ADMIN — GADOBUTROL 10 ML: 604.72 INJECTION INTRAVENOUS at 17:23

## 2025-08-13 RX ADMIN — SODIUM CHLORIDE: 0.9 INJECTION, SOLUTION INTRAVENOUS at 13:45

## 2025-08-13 RX ADMIN — SODIUM CHLORIDE 1000 ML: 0.9 INJECTION, SOLUTION INTRAVENOUS at 10:02

## 2025-08-13 RX ADMIN — ONDANSETRON 4 MG: 2 INJECTION, SOLUTION INTRAMUSCULAR; INTRAVENOUS at 10:04

## 2025-08-13 RX ADMIN — SODIUM CHLORIDE, SODIUM LACTATE, POTASSIUM CHLORIDE, AND CALCIUM CHLORIDE 1000 ML: .6; .31; .03; .02 INJECTION, SOLUTION INTRAVENOUS at 16:31

## 2025-08-13 RX ADMIN — IOPAMIDOL 100 ML: 755 INJECTION, SOLUTION INTRAVENOUS at 10:22

## 2025-08-13 RX ADMIN — PANTOPRAZOLE SODIUM 40 MG: 40 INJECTION, POWDER, FOR SOLUTION INTRAVENOUS at 16:41

## 2025-08-13 RX ADMIN — ONDANSETRON 4 MG: 2 INJECTION INTRAMUSCULAR; INTRAVENOUS at 16:31

## 2025-08-13 RX ADMIN — CALCIUM CARBONATE (ANTACID) CHEW TAB 500 MG 1000 MG: 500 CHEW TAB at 17:01

## 2025-08-13 RX ADMIN — HYDROMORPHONE HYDROCHLORIDE 0.4 MG: 0.2 INJECTION, SOLUTION INTRAMUSCULAR; INTRAVENOUS; SUBCUTANEOUS at 16:31

## 2025-08-13 RX ADMIN — HYDROMORPHONE HYDROCHLORIDE 0.5 MG: 1 INJECTION, SOLUTION INTRAMUSCULAR; INTRAVENOUS; SUBCUTANEOUS at 11:13

## 2025-08-13 RX ADMIN — OXYCODONE HYDROCHLORIDE 5 MG: 5 TABLET ORAL at 19:00

## 2025-08-13 RX ADMIN — HYDROMORPHONE HYDROCHLORIDE 1 MG: 1 INJECTION, SOLUTION INTRAMUSCULAR; INTRAVENOUS; SUBCUTANEOUS at 13:47

## 2025-08-13 RX ADMIN — SODIUM CHLORIDE 65 ML: 9 INJECTION, SOLUTION INTRAVENOUS at 10:22

## 2025-08-13 ASSESSMENT — ACTIVITIES OF DAILY LIVING (ADL)
WEAR_GLASSES_OR_BLIND: YES
ADLS_ACUITY_SCORE: 19
HEARING_DIFFICULTY_OR_DEAF: NO
ADLS_ACUITY_SCORE: 22
DRESSING/BATHING_DIFFICULTY: NO
ADLS_ACUITY_SCORE: 42
ADLS_ACUITY_SCORE: 22
ADLS_ACUITY_SCORE: 42
ADLS_ACUITY_SCORE: 22
ADLS_ACUITY_SCORE: 42
ADLS_ACUITY_SCORE: 42
FALL_HISTORY_WITHIN_LAST_SIX_MONTHS: NO
ADLS_ACUITY_SCORE: 42
DOING_ERRANDS_INDEPENDENTLY_DIFFICULTY: NO
ADLS_ACUITY_SCORE: 42
DIFFICULTY_COMMUNICATING: NO
DIFFICULTY_EATING/SWALLOWING: NO
ADLS_ACUITY_SCORE: 19
WALKING_OR_CLIMBING_STAIRS_DIFFICULTY: NO
ADLS_ACUITY_SCORE: 42
ADLS_ACUITY_SCORE: 42
ADLS_ACUITY_SCORE: 19
ADLS_ACUITY_SCORE: 19
VISION_MANAGEMENT: GLASSES WITH PATIENT
TOILETING_ISSUES: NO
CHANGE_IN_FUNCTIONAL_STATUS_SINCE_ONSET_OF_CURRENT_ILLNESS/INJURY: NO
CONCENTRATING,_REMEMBERING_OR_MAKING_DECISIONS_DIFFICULTY: NO

## 2025-08-14 VITALS
SYSTOLIC BLOOD PRESSURE: 129 MMHG | TEMPERATURE: 98.6 F | HEART RATE: 88 BPM | HEIGHT: 70 IN | BODY MASS INDEX: 31.42 KG/M2 | DIASTOLIC BLOOD PRESSURE: 77 MMHG | RESPIRATION RATE: 17 BRPM | WEIGHT: 219.5 LBS | OXYGEN SATURATION: 97 %

## 2025-08-14 LAB
ALBUMIN SERPL BCG-MCNC: 3.6 G/DL (ref 3.5–5.2)
ALP SERPL-CCNC: 120 U/L (ref 40–150)
ALT SERPL W P-5'-P-CCNC: 23 U/L (ref 0–70)
ANION GAP SERPL CALCULATED.3IONS-SCNC: 12 MMOL/L (ref 7–15)
AST SERPL W P-5'-P-CCNC: 20 U/L (ref 0–45)
BILIRUB SERPL-MCNC: 0.6 MG/DL
BUN SERPL-MCNC: 7.4 MG/DL (ref 8–23)
CALCIUM SERPL-MCNC: 8.7 MG/DL (ref 8.8–10.4)
CHLORIDE SERPL-SCNC: 98 MMOL/L (ref 98–107)
CREAT SERPL-MCNC: 0.69 MG/DL (ref 0.67–1.17)
EGFRCR SERPLBLD CKD-EPI 2021: >90 ML/MIN/1.73M2
ERYTHROCYTE [DISTWIDTH] IN BLOOD BY AUTOMATED COUNT: 15 % (ref 10–15)
GLUCOSE BLDC GLUCOMTR-MCNC: 226 MG/DL (ref 70–99)
GLUCOSE BLDC GLUCOMTR-MCNC: 239 MG/DL (ref 70–99)
GLUCOSE BLDC GLUCOMTR-MCNC: 242 MG/DL (ref 70–99)
GLUCOSE BLDC GLUCOMTR-MCNC: 272 MG/DL (ref 70–99)
GLUCOSE BLDC GLUCOMTR-MCNC: 273 MG/DL (ref 70–99)
GLUCOSE BLDC GLUCOMTR-MCNC: 326 MG/DL (ref 70–99)
GLUCOSE SERPL-MCNC: 234 MG/DL (ref 70–99)
HCO3 SERPL-SCNC: 26 MMOL/L (ref 22–29)
HCT VFR BLD AUTO: 40.2 % (ref 40–53)
HGB BLD-MCNC: 14.5 G/DL (ref 13.3–17.7)
MCH RBC QN AUTO: 32.4 PG (ref 26.5–33)
MCHC RBC AUTO-ENTMCNC: 36.1 G/DL (ref 31.5–36.5)
MCV RBC AUTO: 89.7 FL (ref 78–100)
PLATELET # BLD AUTO: 181 10E3/UL (ref 150–450)
POTASSIUM SERPL-SCNC: 4.2 MMOL/L (ref 3.4–5.3)
PROT SERPL-MCNC: 6.1 G/DL (ref 6.4–8.3)
RBC # BLD AUTO: 4.48 10E6/UL (ref 4.4–5.9)
SODIUM SERPL-SCNC: 136 MMOL/L (ref 135–145)
WBC # BLD AUTO: 5.27 10E3/UL (ref 4–11)

## 2025-08-14 PROCEDURE — 250N000011 HC RX IP 250 OP 636: Performed by: PHYSICIAN ASSISTANT

## 2025-08-14 PROCEDURE — G0463 HOSPITAL OUTPT CLINIC VISIT: HCPCS

## 2025-08-14 PROCEDURE — 85018 HEMOGLOBIN: CPT | Performed by: PHYSICIAN ASSISTANT

## 2025-08-14 PROCEDURE — 36415 COLL VENOUS BLD VENIPUNCTURE: CPT | Performed by: PHYSICIAN ASSISTANT

## 2025-08-14 PROCEDURE — 258N000003 HC RX IP 258 OP 636: Performed by: PHYSICIAN ASSISTANT

## 2025-08-14 PROCEDURE — 99232 SBSQ HOSP IP/OBS MODERATE 35: CPT | Performed by: HOSPITALIST

## 2025-08-14 PROCEDURE — 120N000001 HC R&B MED SURG/OB

## 2025-08-14 PROCEDURE — 84155 ASSAY OF PROTEIN SERUM: CPT | Performed by: PHYSICIAN ASSISTANT

## 2025-08-14 RX ADMIN — SODIUM CHLORIDE, SODIUM LACTATE, POTASSIUM CHLORIDE, AND CALCIUM CHLORIDE: .6; .31; .03; .02 INJECTION, SOLUTION INTRAVENOUS at 03:51

## 2025-08-14 RX ADMIN — INSULIN ASPART 3 UNITS: 100 INJECTION, SOLUTION INTRAVENOUS; SUBCUTANEOUS at 22:08

## 2025-08-14 RX ADMIN — INSULIN ASPART 3 UNITS: 100 INJECTION, SOLUTION INTRAVENOUS; SUBCUTANEOUS at 00:06

## 2025-08-14 RX ADMIN — INSULIN ASPART 2 UNITS: 100 INJECTION, SOLUTION INTRAVENOUS; SUBCUTANEOUS at 03:57

## 2025-08-14 RX ADMIN — INSULIN ASPART 3 UNITS: 100 INJECTION, SOLUTION INTRAVENOUS; SUBCUTANEOUS at 18:00

## 2025-08-14 RX ADMIN — INSULIN ASPART 4 UNITS: 100 INJECTION, SOLUTION INTRAVENOUS; SUBCUTANEOUS at 12:59

## 2025-08-14 RX ADMIN — INSULIN ASPART 2 UNITS: 100 INJECTION, SOLUTION INTRAVENOUS; SUBCUTANEOUS at 08:35

## 2025-08-14 RX ADMIN — PANTOPRAZOLE SODIUM 40 MG: 40 INJECTION, POWDER, FOR SOLUTION INTRAVENOUS at 08:35

## 2025-08-14 ASSESSMENT — ACTIVITIES OF DAILY LIVING (ADL)
ADLS_ACUITY_SCORE: 23
ADLS_ACUITY_SCORE: 22
ADLS_ACUITY_SCORE: 23
ADLS_ACUITY_SCORE: 22
ADLS_ACUITY_SCORE: 22
ADLS_ACUITY_SCORE: 23
ADLS_ACUITY_SCORE: 22
ADLS_ACUITY_SCORE: 22
ADLS_ACUITY_SCORE: 23
ADLS_ACUITY_SCORE: 22
ADLS_ACUITY_SCORE: 22
ADLS_ACUITY_SCORE: 23
ADLS_ACUITY_SCORE: 22
ADLS_ACUITY_SCORE: 23
ADLS_ACUITY_SCORE: 22
ADLS_ACUITY_SCORE: 23
ADLS_ACUITY_SCORE: 22

## 2025-08-15 VITALS
HEART RATE: 62 BPM | OXYGEN SATURATION: 92 % | DIASTOLIC BLOOD PRESSURE: 77 MMHG | TEMPERATURE: 97.8 F | BODY MASS INDEX: 31.42 KG/M2 | HEIGHT: 70 IN | SYSTOLIC BLOOD PRESSURE: 132 MMHG | WEIGHT: 219.5 LBS | RESPIRATION RATE: 20 BRPM

## 2025-08-15 LAB
GLUCOSE BLDC GLUCOMTR-MCNC: 224 MG/DL (ref 70–99)
GLUCOSE BLDC GLUCOMTR-MCNC: 255 MG/DL (ref 70–99)

## 2025-08-15 PROCEDURE — 99239 HOSP IP/OBS DSCHRG MGMT >30: CPT | Performed by: HOSPITALIST

## 2025-08-15 PROCEDURE — 250N000011 HC RX IP 250 OP 636: Performed by: PHYSICIAN ASSISTANT

## 2025-08-15 RX ADMIN — PANTOPRAZOLE SODIUM 40 MG: 40 INJECTION, POWDER, FOR SOLUTION INTRAVENOUS at 08:00

## 2025-08-15 RX ADMIN — INSULIN ASPART 3 UNITS: 100 INJECTION, SOLUTION INTRAVENOUS; SUBCUTANEOUS at 07:57

## 2025-08-15 ASSESSMENT — ACTIVITIES OF DAILY LIVING (ADL)
ADLS_ACUITY_SCORE: 23

## 2025-08-18 ENCOUNTER — PATIENT OUTREACH (OUTPATIENT)
Dept: CARE COORDINATION | Facility: CLINIC | Age: 60
End: 2025-08-18
Payer: COMMERCIAL